# Patient Record
Sex: MALE | Race: BLACK OR AFRICAN AMERICAN | NOT HISPANIC OR LATINO | Employment: STUDENT | URBAN - METROPOLITAN AREA
[De-identification: names, ages, dates, MRNs, and addresses within clinical notes are randomized per-mention and may not be internally consistent; named-entity substitution may affect disease eponyms.]

---

## 2023-03-14 RX ORDER — SODIUM FLUORIDE 5 MG/G
GEL, DENTIFRICE DENTAL
COMMUNITY
Start: 2022-02-04 | End: 2023-05-02 | Stop reason: SDUPTHER

## 2023-03-14 RX ORDER — BENZOYL PEROXIDE 100 MG/ML
LIQUID TOPICAL
COMMUNITY
Start: 2022-02-04 | End: 2023-05-02 | Stop reason: SDUPTHER

## 2023-03-15 RX ORDER — BENZOYL PEROXIDE 100 MG/ML
LIQUID TOPICAL
Qty: 237 G | Refills: 2 | OUTPATIENT
Start: 2023-03-15

## 2023-03-15 RX ORDER — SODIUM FLUORIDE 5 MG/G
56 GEL, DENTIFRICE DENTAL DAILY
Qty: 56 G | Refills: 2 | OUTPATIENT
Start: 2023-03-15

## 2023-04-06 DIAGNOSIS — K59.04 CHRONIC IDIOPATHIC CONSTIPATION: Primary | ICD-10-CM

## 2023-04-06 RX ORDER — BISACODYL 5 MG
5 TABLET, DELAYED RELEASE (ENTERIC COATED) ORAL DAILY PRN
COMMUNITY
Start: 2022-04-22 | End: 2023-04-06 | Stop reason: SDUPTHER

## 2023-04-07 RX ORDER — BISACODYL 5 MG
5 TABLET, DELAYED RELEASE (ENTERIC COATED) ORAL DAILY PRN
Qty: 90 TABLET | Refills: 0 | Status: SHIPPED | OUTPATIENT
Start: 2023-04-07 | End: 2023-05-02 | Stop reason: SDUPTHER

## 2023-05-02 ENCOUNTER — OFFICE VISIT (OUTPATIENT)
Dept: PRIMARY CARE | Facility: CLINIC | Age: 23
End: 2023-05-02
Payer: MEDICAID

## 2023-05-02 VITALS
BODY MASS INDEX: 16.29 KG/M2 | HEART RATE: 66 BPM | SYSTOLIC BLOOD PRESSURE: 120 MMHG | RESPIRATION RATE: 17 BRPM | DIASTOLIC BLOOD PRESSURE: 77 MMHG | OXYGEN SATURATION: 99 % | WEIGHT: 111 LBS

## 2023-05-02 DIAGNOSIS — K08.9 DENTAL DISEASE: ICD-10-CM

## 2023-05-02 DIAGNOSIS — Z00.00 HEALTH MAINTENANCE EXAMINATION: Primary | ICD-10-CM

## 2023-05-02 DIAGNOSIS — L70.8 OTHER ACNE: ICD-10-CM

## 2023-05-02 PROCEDURE — 99395 PREV VISIT EST AGE 18-39: CPT | Performed by: STUDENT IN AN ORGANIZED HEALTH CARE EDUCATION/TRAINING PROGRAM

## 2023-05-02 PROCEDURE — 1036F TOBACCO NON-USER: CPT | Performed by: STUDENT IN AN ORGANIZED HEALTH CARE EDUCATION/TRAINING PROGRAM

## 2023-05-02 RX ORDER — HYDROXYZINE HYDROCHLORIDE 10 MG/1
TABLET, FILM COATED ORAL 2 TIMES DAILY
COMMUNITY
Start: 2020-06-26 | End: 2023-08-01 | Stop reason: ALTCHOICE

## 2023-05-02 RX ORDER — SODIUM FLUORIDE 5 MG/G
PASTE, DENTIFRICE DENTAL
COMMUNITY

## 2023-05-02 RX ORDER — ADAPALENE 1 MG/G
1 GEL TOPICAL NIGHTLY
Qty: 45 G | Refills: 3 | Status: SHIPPED | OUTPATIENT
Start: 2023-05-02 | End: 2023-08-01 | Stop reason: ALTCHOICE

## 2023-05-02 RX ORDER — BISACODYL 5 MG
1 TABLET, DELAYED RELEASE (ENTERIC COATED) ORAL DAILY
COMMUNITY
Start: 2022-04-22 | End: 2023-05-02 | Stop reason: SDUPTHER

## 2023-05-02 RX ORDER — LAMOTRIGINE 150 MG/1
1 TABLET ORAL 2 TIMES DAILY
COMMUNITY
Start: 2019-07-16 | End: 2024-03-28

## 2023-05-02 RX ORDER — DIPHENHYDRAMINE HCL 25 MG/1
TABLET ORAL
COMMUNITY
Start: 2023-02-20 | End: 2023-08-01 | Stop reason: ALTCHOICE

## 2023-05-02 RX ORDER — POLYETHYLENE GLYCOL 3350 17 G/17G
POWDER, FOR SOLUTION ORAL
COMMUNITY
Start: 2013-04-09 | End: 2023-12-18 | Stop reason: SDUPTHER

## 2023-05-02 RX ORDER — CLINDAMYCIN PHOSPHATE 10 UG/ML
LOTION TOPICAL 2 TIMES DAILY
COMMUNITY
Start: 2022-02-04 | End: 2023-05-15

## 2023-05-02 RX ORDER — BISMUTH SUBSALICYLATE 525 MG/30ML
LIQUID ORAL
COMMUNITY
Start: 2023-02-20

## 2023-05-02 RX ORDER — HYDROCORTISONE 1 %
CREAM (GRAM) TOPICAL 2 TIMES DAILY
COMMUNITY
Start: 2017-11-29

## 2023-05-02 RX ORDER — SODIUM FLUORIDE 5 MG/G
1 GEL, DENTIFRICE DENTAL 2 TIMES DAILY
Qty: 56 G | Refills: 11 | Status: SHIPPED | OUTPATIENT
Start: 2023-05-02

## 2023-05-02 RX ORDER — CLONIDINE HYDROCHLORIDE 0.1 MG/1
TABLET ORAL
COMMUNITY
Start: 2017-05-19 | End: 2023-12-22 | Stop reason: SDUPTHER

## 2023-05-02 RX ORDER — CYPROHEPTADINE HYDROCHLORIDE 4 MG/1
TABLET ORAL
COMMUNITY
Start: 2020-01-16 | End: 2023-07-07

## 2023-05-02 RX ORDER — TALC
POWDER (GRAM) TOPICAL
COMMUNITY
End: 2023-08-01 | Stop reason: ALTCHOICE

## 2023-05-02 RX ORDER — GUAR GUM
POWDER (GRAM) ORAL
COMMUNITY

## 2023-05-02 RX ORDER — BENZOYL PEROXIDE 100 MG/ML
LIQUID TOPICAL
COMMUNITY
Start: 2022-02-04 | End: 2023-05-02 | Stop reason: SDUPTHER

## 2023-05-02 RX ORDER — BENZOYL PEROXIDE 100 MG/ML
LIQUID TOPICAL
Qty: 187 G | Refills: 11 | Status: SHIPPED | OUTPATIENT
Start: 2023-05-02 | End: 2023-05-02 | Stop reason: SDUPTHER

## 2023-05-02 RX ORDER — BENZOYL PEROXIDE 100 MG/ML
LIQUID TOPICAL
Qty: 187 G | Refills: 11 | Status: SHIPPED | OUTPATIENT
Start: 2023-05-02

## 2023-05-02 RX ORDER — TRIAMCINOLONE ACETONIDE 0.25 MG/G
CREAM TOPICAL
COMMUNITY
End: 2023-08-01 | Stop reason: ALTCHOICE

## 2023-05-02 RX ORDER — MIRTAZAPINE 45 MG/1
1 TABLET, FILM COATED ORAL NIGHTLY
COMMUNITY
Start: 2014-03-23 | End: 2023-12-22 | Stop reason: SDUPTHER

## 2023-05-02 RX ORDER — PSEUDOEPH/DM/GUAIFEN/ACETAMIN 30-10-324
EXPECTORANT ORAL
COMMUNITY
Start: 2023-02-20

## 2023-05-02 RX ORDER — ADHESIVE BANDAGE
BANDAGE TOPICAL
COMMUNITY
Start: 2021-06-02

## 2023-05-02 RX ORDER — BACLOFEN 20 MG
1 TABLET ORAL DAILY
COMMUNITY
Start: 2019-05-07 | End: 2023-08-03

## 2023-05-02 RX ORDER — NALTREXONE HYDROCHLORIDE 50 MG/1
TABLET, FILM COATED ORAL 2 TIMES DAILY
COMMUNITY
Start: 2020-02-26 | End: 2023-12-22 | Stop reason: SDUPTHER

## 2023-05-02 RX ORDER — DIMETHICONE, CAMPHOR (SYNTHETIC), MENTHOL, AND PHENOL 1.1; .5; .625; .5 G/100G; G/100G; G/100G; G/100G
OINTMENT TOPICAL
COMMUNITY
Start: 2023-02-21

## 2023-05-02 RX ORDER — PETROLATUM,WHITE 41 %
OINTMENT (GRAM) TOPICAL
COMMUNITY
Start: 2019-04-17

## 2023-05-02 RX ORDER — CHOLECALCIFEROL (VITAMIN D3) 50 MCG
1 TABLET ORAL DAILY
COMMUNITY
Start: 2021-10-21 | End: 2023-12-22 | Stop reason: SDUPTHER

## 2023-05-02 RX ORDER — DEXTROMETHORPHAN HYDROBROMIDE, GUAIFENESIN 10; 100 MG/5ML; MG/5ML
LIQUID ORAL
COMMUNITY
Start: 2023-02-21

## 2023-05-02 RX ORDER — POLYETHYLENE GLYCOL
OINTMENT (GRAM) TOPICAL
COMMUNITY
Start: 2023-02-20 | End: 2023-12-22 | Stop reason: SDUPTHER

## 2023-05-02 RX ORDER — OMEPRAZOLE 40 MG/1
1 CAPSULE, DELAYED RELEASE ORAL DAILY
COMMUNITY
Start: 2020-03-30 | End: 2023-10-13 | Stop reason: SDUPTHER

## 2023-05-02 RX ORDER — LINACLOTIDE 145 UG/1
CAPSULE, GELATIN COATED ORAL
COMMUNITY
Start: 2022-03-25 | End: 2024-02-26 | Stop reason: SDUPTHER

## 2023-05-02 RX ORDER — ADAPALENE 1 MG/G
1 GEL TOPICAL NIGHTLY
COMMUNITY
Start: 2020-05-01 | End: 2023-05-02 | Stop reason: SDUPTHER

## 2023-05-02 RX ORDER — LACTOSE-REDUCED FOOD 0.04G-1/ML
LIQUID (ML) ORAL
COMMUNITY
Start: 2020-01-16

## 2023-05-02 NOTE — PROGRESS NOTES
John Williamson is a 22 y.o. male seen in Clinic at /[facility] by Dr. Momo Dc on 05/02/23 for routine care, as well as for management of the following chronic medical conditions: Epilepsy, chronic constipation/GI motility issues, ASD, OCD. Patient is accompanied to this visit by his father.     HPI:   #Constipation, EOE, Malnutrition   - Follows with Peds GI at [place]   - PPI for EOE (BID per last note); endoscopy due in 2024  - Miralax, Motegrity, Milk of Mag, Linzess, daily suppository   - Nutritional supplements ([email or web address] 4x/day); close weight monitoring and f/u     #Epilepsy  - Follows with Peds Neuro  - Lamictal 150mg BID   - No recent seizures, well controlled   - Annual visits and levels    #Behavioral Issues, ASD, OCD  - Adult psych, Amrit Kellymantel   - Clonidine 0.05mg TID, Naltrexone 12.5mg BID, Hydroxyzine 10mg BID, Remeron 45mg QHS    Past Medical History: as above (ASD diagnosed around 2.5 years of age)  Birth History: uncomplicated pregnancy and birth history per father     Past Medical History:   Diagnosis Date    Contusion of scalp, subsequent encounter 06/15/2017    Contusion of scalp, subsequent encounter    Developmental disorder of scholastic skills, unspecified     Learning disability    Local infection of the skin and subcutaneous tissue, unspecified 02/22/2014    Finger infection    Personal history of diseases of the blood and blood-forming organs and certain disorders involving the immune mechanism 11/18/2020    History of anemia    Personal history of other (healed) physical injury and trauma 06/15/2017    History of strain    Personal history of other diseases of the nervous system and sense organs 03/23/2014    History of acute otitis media    Personal history of other mental and behavioral disorders     History of mental disorder    Personal history of other specified conditions 06/20/2020    History of fever    Unspecified convulsions (CMS/HCC) 12/23/2014    Seizures     Unspecified injury of left ankle, initial encounter 05/24/2018    Left ankle injury     Subspecialty Medical Care: GI and Neuro (peds) but will continue to see him; adult psychiatry     Past Surgical History: upper GI, no sedation issues   No past surgical history on file.    Medications: Prevident, Acne     Current Outpatient Medications:     Banophen 25 mg tablet, Take by mouth., Disp: , Rfl:     bismuth subsalicylate (Pepto Bismol) 262 mg/15 mL suspension, Take by mouth., Disp: , Rfl:     Blistex Medicated 0.6-0.5-1.1-0.5 % ointment ointment, , Disp: , Rfl:     Chest Congestion Relief DM  mg/5 mL syrup, , Disp: , Rfl:     cholecalciferol (Vitamin D-3) 50 MCG (2000 UT) tablet, Take 1 tablet (50 mcg) by mouth once daily., Disp: , Rfl:     cloNIDine (Catapres) 0.1 mg tablet, Take by mouth., Disp: , Rfl:     cyproheptadine (Periactin) 4 mg tablet, Take by mouth., Disp: , Rfl:     food supplemt, lactose-reduced (Boost Plus) 0.06 gram- 1.5 kcal/mL liquid, Take by mouth., Disp: , Rfl:     hydrocortisone 1 % cream, Apply topically twice a day., Disp: , Rfl:     hydrOXYzine HCL (Atarax) 10 mg tablet, Take by mouth twice a day., Disp: , Rfl:     lamoTRIgine (LaMICtal) 150 mg tablet, Take 1 tablet (150 mg) by mouth 2 times a day., Disp: , Rfl:     Linzess 145 mcg capsule, Take by mouth once daily., Disp: , Rfl:     magnesium hydroxide (Milk of Magnesia) 400 mg/5 mL suspension, Take by mouth., Disp: , Rfl:     magnesium oxide 500 mg tablet, Take 1 tablet (500 mg) by mouth once daily., Disp: , Rfl:     menthol 5.8 mg lozenge, Take by mouth., Disp: , Rfl:     mirtazapine (Remeron) 45 mg tablet, Take 1 tablet (45 mg) by mouth once daily at bedtime., Disp: , Rfl:     naltrexone (Depade) 50 mg tablet, Take by mouth twice a day., Disp: , Rfl:     omeprazole (PriLOSEC) 40 mg DR capsule, Take 1 capsule (40 mg) by mouth once daily., Disp: , Rfl:     pe glycol 3350-peg 400, bulk, (Lip Balm Base, Bulk,) 1.3 gm/3 mL ointment, USE  AS DIRECTED., Disp: , Rfl:     polyethylene glycol (Glycolax) 17 gram/dose powder, Take by mouth., Disp: , Rfl:     sodium phosphates 19-7 gram/197 mL enema, Insert into the rectum., Disp: , Rfl:     Stool Softener 100 mg capsule, Take 1 capsule (100 mg) by mouth 2 times a day., Disp: , Rfl:     Triple Antibiotic ointment, APPLY TOPICALLY TO CUTS/SCRAPES/SKIN ABRASIONS EVERY 12 HOURS AS NEEDED TO PREVENT SKIN INFECTIONS, Disp: , Rfl:     white petrolatum (Aquaphor Healing) 41 % ointment ointment, apply to lips every 4 hours while awake and PRN, Disp: , Rfl:     witch hazeL 20 % pads, medicated, USE AS DIRECTED ON PACKAGE, Disp: , Rfl:     adapalene (Differin) 0.1 % gel, Apply 1 Application topically once daily at bedtime., Disp: 45 g, Rfl: 3    benzoyl peroxide (Benzac AC) 10 % external wash, Apply topically once daily., Disp: 187 g, Rfl: 11    bisacodyl (Dulcolax) 5 mg EC tablet, Take 1 tablet (5 mg) by mouth once daily. Do not crush, chew, or split., Disp: , Rfl:     bisacodyl (Dulcolax, bisacodyl,) 10 mg suppository, Insert 1 suppository (10 mg) into the rectum once daily., Disp: , Rfl:     clindamycin (Cleocin T) 1 % lotion, APPLY TOPICALLY TO FACE, CHEST, AND BACK ONCE DAILY AFTER WASHING., Disp: 60 mL, Rfl: 3    fluoride, sodium, (Prevident 5000 Plus) 1.1 % dental cream, Apply topically., Disp: , Rfl:     fluoride, sodium, (PreviDent) 1.1 % gel, Apply 1 Application to teeth 2 times a day., Disp: 56 g, Rfl: 11    melatonin 3 mg tablet, Take by mouth., Disp: , Rfl:     nutritional supplement-caloric (Benecalorie) 7.5 kcal/mL liquid, Take by mouth., Disp: , Rfl:     omeprazole (PriLOSEC) 40 mg DR capsule, TAKE 1 CAPSULE BY MOUTH ONCE A DAY., Disp: 90 capsule, Rfl: 1    triamcinolone (Kenalog) 0.025 % cream, Apply topically to affected area 2 times a day until resolved., Disp: , Rfl:     Vitamin D3 50 mcg (2,000 unit) tablet, TAKE 1 TABLET BY MOUTH DAILY., Disp: 90 tablet, Rfl: 1    witch hazel-glycerin-aloe vera  "50 % pads, medicated, Apply topically., Disp: , Rfl:   Pharmacy: [facility]    Allergies: NKDA  No Known Allergies    Immunizations: considering HPV; Tdap due 2026; COVID x3     Family History: Dad with HTN, Mom with Depression, Paternal Grandfather with dementia (late onset, likely Alzheimer's)  No family history on file.    Social History:   Home/Living Situation: [facility], feels safe; visits every Sunday; mom and dad, older sister  Education: Graduated from [employer]; day program in [place], enjoys himself   Activities: enjoys sports, cars  Drug Use: never use  Diet: trouble with weight gain; takes everything by mouth; very long to eat meals; never nutrition through feeding tube; previously on Boost, now on Mtime   Sexuality/Contraception/Menstrual History: never sexually active; family defers STI testing   Suicide/Depression/Anxiety: describes his mood as \"happy\"   Sleep: no sleep concerns    Transition Processes:  Financial/Health Insurance: Medicaid   Transportation: Mom and Dad   Legal/Guardian: Mother has guardianship   Contact Information: [phone] (dad); [phone] (mom)     Visit Vitals  /77 (BP Location: Right arm, Patient Position: Sitting)   Pulse 66   Resp 17   Wt 50.3 kg (111 lb)   SpO2 99%   BMI 16.29 kg/m²   Smoking Status Never   BSA 1.57 m²      PHYSICAL EXAM:   General: well appearing AA male, largely non-verbal with one words responses, limited eye contact during visit, unable to really meaningfully engage in visit   HEENT: MMM, NCAT  CV: RRR  PULM: CTAB  ABD: thin, soft, NT, ND, + bowel sounds  : no suprapubic or CVA tenderness  EXT: WWP, thin, no edema  SKIN: no rashes  NEURO: poor eye contact and difficult to engage in exam, normal gait, preserved strength, symmetric facies   PSYCH: largely non-verbal but not aggressive and overall pleasant during encounter     Assessment/Plan    John Williamson is a 22 y.o. male seen in Clinic at /[facility] by Dr. Barr " Dao on 05/02/23 for routine care, as well as for management of the following chronic medical conditions: Epilepsy, chronic constipation/GI motility issues, ASD, OCD. Patient is accompanied to this visit by his father.     HPI:   #Constipation, EOE, Malnutrition   - Follows with Peds GI at [place]   - PPI for EOE (BID per last note); endoscopy due in 2024  - Miralax, Motegrity, Milk of Mag, Linzess, daily suppository   - Nutritional supplements ([employer] 4x/day); close weight monitoring and f/u     #Epilepsy  - Follows with Peds Neuro  - Lamictal 150mg BID   - No recent seizures, well controlled   - Annual visits and levels    #Behavioral Issues, ASD, OCD  - Adult psych, Amrit Nguyen   - Clonidine 0.05mg TID, Naltrexone 12.5mg BID, Hydroxyzine 10mg BID, Remeron 45mg QHS    #Health Maintenance   - Vision, Hearing screens: no recent concerns   - Counseling regarding alcohol/tobacco/drug use: DENIES USE   - Depression screen: follows with psych   - BMI, Lipid, A1C screening and nutritional/exercise counseling: repeat CMP today, prior lipid panel 2022 reassuring; repeat every 2-3 years   - Blood Pressure: 120/77  - Safe Sexual Practices, STI, HIV screening: never sexually active, family defers testing     #Transition of Care/Young Adult Care  - Health Literacy Assessment: working to engage patient in visit; accompanied by father; mother has guardianship of patient   - Medications: Active medications reviewed and updated  - Allergies: NONE  - Immunizations: considering HPV, Tdap due 2026; COVID and Flu shots recommended per CDC guidelines   - Identified Adult or Subspecialty Providers: Dr. Momo Dc, Dr. Amrit Nguyen; follows with Peds GI and Neuro teams who per father are happy to continue seeing him   - Resources Provided: [facility]; discussed inpatient Med-Peds consult service should he require this during future admissions     Return to clinic in annually for CPE, sooner if  any issues arise.     Momo Dc MD  Internal Medicine-Pediatrics   [facility] [address], [address]  P: [phone], F: [phone]

## 2023-05-02 NOTE — PATIENT INSTRUCTIONS
Thank you for coming in to see us today!    Refills for you facewash and face cream for acne has been sent to the pharmacy, along with your specialty toothpaste.     Please get labs done with the next Lamotrigine level needed from neurology.    Let me know if you need anything else in the meantime!    Best,  Dr. Dc

## 2023-05-15 DIAGNOSIS — L70.8 OTHER ACNE: Primary | ICD-10-CM

## 2023-05-15 RX ORDER — CLINDAMYCIN PHOSPHATE 10 UG/ML
LOTION TOPICAL
Qty: 60 ML | Refills: 0 | Status: SHIPPED | OUTPATIENT
Start: 2023-05-15 | End: 2023-05-16

## 2023-05-16 DIAGNOSIS — L70.8 OTHER ACNE: ICD-10-CM

## 2023-05-16 RX ORDER — CLINDAMYCIN PHOSPHATE 10 UG/ML
LOTION TOPICAL
Qty: 60 ML | Refills: 3 | Status: SHIPPED | OUTPATIENT
Start: 2023-05-16 | End: 2024-02-19

## 2023-06-20 RX ORDER — DOCUSATE SODIUM 100 MG
100 CAPSULE ORAL 2 TIMES DAILY
COMMUNITY
Start: 2023-05-05 | End: 2024-02-27

## 2023-06-20 RX ORDER — BISACODYL 5 MG
5 TABLET, DELAYED RELEASE (ENTERIC COATED) ORAL DAILY
COMMUNITY
End: 2023-12-18 | Stop reason: SDUPTHER

## 2023-06-20 RX ORDER — BISACODYL 10 MG/1
10 SUPPOSITORY RECTAL DAILY
COMMUNITY
End: 2023-11-14 | Stop reason: SDUPTHER

## 2023-07-07 DIAGNOSIS — R62.7 FAILURE TO THRIVE IN ADULT: Primary | ICD-10-CM

## 2023-07-07 RX ORDER — CYPROHEPTADINE HYDROCHLORIDE 4 MG/1
8 TABLET ORAL NIGHTLY
Qty: 180 TABLET | Refills: 1 | Status: SHIPPED | OUTPATIENT
Start: 2023-07-07 | End: 2023-12-11 | Stop reason: SDUPTHER

## 2023-08-01 ENCOUNTER — OFFICE VISIT (OUTPATIENT)
Dept: PRIMARY CARE | Facility: CLINIC | Age: 23
End: 2023-08-01
Payer: MEDICAID

## 2023-08-01 VITALS
BODY MASS INDEX: 17.18 KG/M2 | OXYGEN SATURATION: 98 % | WEIGHT: 116 LBS | HEIGHT: 69 IN | HEART RATE: 91 BPM | DIASTOLIC BLOOD PRESSURE: 76 MMHG | SYSTOLIC BLOOD PRESSURE: 118 MMHG

## 2023-08-01 DIAGNOSIS — F84.0 AUTISTIC DISORDER (HHS-HCC): ICD-10-CM

## 2023-08-01 DIAGNOSIS — L70.8 OTHER ACNE: ICD-10-CM

## 2023-08-01 DIAGNOSIS — J30.2 SEASONAL ALLERGIES: ICD-10-CM

## 2023-08-01 DIAGNOSIS — F41.9 ANXIETY: ICD-10-CM

## 2023-08-01 DIAGNOSIS — B35.1 ONYCHOMYCOSIS: Primary | ICD-10-CM

## 2023-08-01 DIAGNOSIS — R21 RASH: ICD-10-CM

## 2023-08-01 DIAGNOSIS — R05.9 COUGH, UNSPECIFIED TYPE: ICD-10-CM

## 2023-08-01 DIAGNOSIS — G40.909 NONINTRACTABLE EPILEPSY WITHOUT STATUS EPILEPTICUS, UNSPECIFIED EPILEPSY TYPE (MULTI): ICD-10-CM

## 2023-08-01 DIAGNOSIS — G47.00 INSOMNIA, UNSPECIFIED TYPE: ICD-10-CM

## 2023-08-01 PROBLEM — G47.9 SLEEP DISTURBANCES: Status: ACTIVE | Noted: 2023-08-01

## 2023-08-01 PROBLEM — K13.0 DRY LIPS: Status: ACTIVE | Noted: 2023-08-01

## 2023-08-01 PROBLEM — R62.7 FAILURE TO THRIVE IN ADULT: Status: ACTIVE | Noted: 2023-08-01

## 2023-08-01 PROBLEM — R63.4 WEIGHT LOSS, UNINTENTIONAL: Status: ACTIVE | Noted: 2023-08-01

## 2023-08-01 PROBLEM — K59.09 CHRONIC CONSTIPATION: Status: ACTIVE | Noted: 2023-08-01

## 2023-08-01 PROBLEM — R14.0 ABDOMINAL BLOATING: Status: ACTIVE | Noted: 2023-08-01

## 2023-08-01 PROBLEM — K56.41 FECAL IMPACTION OF COLON (MULTI): Status: ACTIVE | Noted: 2023-08-01

## 2023-08-01 PROBLEM — F91.8 TEMPER TANTRUMS: Status: ACTIVE | Noted: 2023-08-01

## 2023-08-01 PROBLEM — L70.9 ACNE: Status: ACTIVE | Noted: 2023-08-01

## 2023-08-01 PROBLEM — K64.9 HEMORRHOIDS: Status: ACTIVE | Noted: 2023-08-01

## 2023-08-01 PROBLEM — R46.81 OBSESSIVE-COMPULSIVE BEHAVIOR: Status: ACTIVE | Noted: 2023-08-01

## 2023-08-01 PROBLEM — T14.8XXA SKIN ABRASION: Status: ACTIVE | Noted: 2023-08-01

## 2023-08-01 PROBLEM — R19.00 ABDOMINAL MASS: Status: ACTIVE | Noted: 2023-08-01

## 2023-08-01 PROBLEM — F42.9 OBSESSIVE COMPULSIVE DISORDER: Status: ACTIVE | Noted: 2023-08-01

## 2023-08-01 PROBLEM — J30.9 ALLERGIC RHINITIS: Status: ACTIVE | Noted: 2023-08-01

## 2023-08-01 PROBLEM — K58.9 IBS (IRRITABLE BOWEL SYNDROME): Status: ACTIVE | Noted: 2023-08-01

## 2023-08-01 PROBLEM — K20.0 EOSINOPHILIC ESOPHAGITIS: Status: ACTIVE | Noted: 2023-08-01

## 2023-08-01 PROBLEM — F71 MODERATE INTELLECTUAL DISABILITIES: Status: ACTIVE | Noted: 2023-08-01

## 2023-08-01 PROCEDURE — 99213 OFFICE O/P EST LOW 20 MIN: CPT | Performed by: STUDENT IN AN ORGANIZED HEALTH CARE EDUCATION/TRAINING PROGRAM

## 2023-08-01 PROCEDURE — 1036F TOBACCO NON-USER: CPT | Performed by: STUDENT IN AN ORGANIZED HEALTH CARE EDUCATION/TRAINING PROGRAM

## 2023-08-01 RX ORDER — CICLOPIROX 80 MG/ML
SOLUTION TOPICAL NIGHTLY
Qty: 6.6 ML | Refills: 1 | Status: SHIPPED | OUTPATIENT
Start: 2023-08-01

## 2023-08-01 RX ORDER — PRUCALOPRIDE 2 MG/1
1 TABLET, FILM COATED ORAL DAILY
COMMUNITY
Start: 2023-05-17

## 2023-08-01 NOTE — PROGRESS NOTES
John Williamson is a 22 y.o. male seen in Clinic at /[facility] by Dr. Momo Dc on 08/01/23 for routine care, as well as for management of the following chronic medical conditions: Epilepsy, chronic constipation/GI motility issues, ASD, OCD. Patient is accompanied to this visit by his father. He presents today with concern for great toe discoloration bilaterally. On exam, discoloration is very mild, almost non-existent of R great toe. Some discoloration and possibly very early onychomycosis of L great toe, most notably on the border. No indication for oral treatment at this time, can trial topical and continue with good hygiene and nail care. Podiatry referral per request for ongoing care.     #Onychomycosis  - mild, L great toe  - topical antifungal, Ciclopirox  - podiatry for ongoing care     CHRONIC MEDICAL CONDITIONS:   #Constipation, EOE, Malnutrition   - Follows with Peds GI at [place]   - PPI for EOE (BID per last note); endoscopy due in 2024  - Miralax, Motegrity, Milk of Mag, Linzess, daily suppository   - Nutritional supplements (Mysterio 4x/day); close weight monitoring and f/u--weight up 5 punds from visit in May 2023!    #Epilepsy  - Follows with Peds Neuro  - Lamictal 150mg BID   - No recent seizures, well controlled   - Annual visits and levels    #Behavioral Issues, ASD, OCD  - Adult psych, Amrit Kellymankellyl   - Clonidine 0.05mg TID, Naltrexone 12.5mg BID, Hydroxyzine 10mg BID, Remeron 45mg QHS    Past Medical History: as above (ASD diagnosed around 2.5 years of age)  Birth History: uncomplicated pregnancy and birth history per father     Past Medical History:   Diagnosis Date    Contusion of scalp, subsequent encounter 06/15/2017    Contusion of scalp, subsequent encounter    Developmental disorder of scholastic skills, unspecified     Learning disability    Local infection of the skin and subcutaneous tissue, unspecified 02/22/2014    Finger infection    Personal history of diseases of  the blood and blood-forming organs and certain disorders involving the immune mechanism 11/18/2020    History of anemia    Personal history of other (healed) physical injury and trauma 06/15/2017    History of strain    Personal history of other diseases of the nervous system and sense organs 03/23/2014    History of acute otitis media    Personal history of other mental and behavioral disorders     History of mental disorder    Personal history of other specified conditions 06/20/2020    History of fever    Unspecified convulsions (CMS/HCC) 12/23/2014    Seizures    Unspecified injury of left ankle, initial encounter 05/24/2018    Left ankle injury     Subspecialty Medical Care: GI and Neuro (peds) but will continue to see him; adult psychiatry     Past Surgical History: upper GI, no sedation issues   No past surgical history on file.    Medications: Prevident, Acne   Current Outpatient Medications:     benzoyl peroxide (Benzac AC) 10 % external wash, Apply topically once daily., Disp: 187 g, Rfl: 11    bisacodyl (Dulcolax) 5 mg EC tablet, Take 1 tablet (5 mg) by mouth once daily. Do not crush, chew, or split., Disp: , Rfl:     bisacodyl (Dulcolax, bisacodyl,) 10 mg suppository, Insert 1 suppository (10 mg) into the rectum once daily., Disp: , Rfl:     bismuth subsalicylate (Pepto Bismol) 262 mg/15 mL suspension, Take by mouth., Disp: , Rfl:     Blistex Medicated 0.6-0.5-1.1-0.5 % ointment ointment, , Disp: , Rfl:     Chest Congestion Relief DM  mg/5 mL syrup, , Disp: , Rfl:     cholecalciferol (Vitamin D-3) 50 MCG (2000 UT) tablet, Take 1 tablet (50 mcg) by mouth once daily., Disp: , Rfl:     clindamycin (Cleocin T) 1 % lotion, APPLY TOPICALLY TO FACE, CHEST, AND BACK ONCE DAILY AFTER WASHING., Disp: 60 mL, Rfl: 3    cloNIDine (Catapres) 0.1 mg tablet, Take by mouth., Disp: , Rfl:     cyproheptadine (Periactin) 4 mg tablet, Take 2 tablets (8 mg) by mouth once daily at bedtime., Disp: 180 tablet, Rfl: 1     fluoride, sodium, (Prevident 5000 Plus) 1.1 % dental cream, Apply topically., Disp: , Rfl:     fluoride, sodium, (PreviDent) 1.1 % gel, Apply 1 Application to teeth 2 times a day., Disp: 56 g, Rfl: 11    food supplemt, lactose-reduced (Boost Plus) 0.06 gram- 1.5 kcal/mL liquid, Take by mouth., Disp: , Rfl:     hydrocortisone 1 % cream, Apply topically twice a day., Disp: , Rfl:     lamoTRIgine (LaMICtal) 150 mg tablet, Take 1 tablet (150 mg) by mouth 2 times a day., Disp: , Rfl:     Linzess 145 mcg capsule, Take by mouth once daily., Disp: , Rfl:     magnesium hydroxide (Milk of Magnesia) 400 mg/5 mL suspension, Take by mouth., Disp: , Rfl:     magnesium oxide 500 mg tablet, Take 1 tablet (500 mg) by mouth once daily., Disp: , Rfl:     mirtazapine (Remeron) 45 mg tablet, Take 1 tablet (45 mg) by mouth once daily at bedtime., Disp: , Rfl:     Motegrity 2 mg tablet, Take 1 tablet by mouth once daily., Disp: , Rfl:     naltrexone (Depade) 50 mg tablet, Take by mouth twice a day., Disp: , Rfl:     nutritional supplement-caloric (Benecalorie) 7.5 kcal/mL liquid, Take by mouth., Disp: , Rfl:     omeprazole (PriLOSEC) 40 mg DR capsule, Take 1 capsule (40 mg) by mouth once daily., Disp: , Rfl:     pe glycol 3350-peg 400, bulk, (Lip Balm Base, Bulk,) 1.3 gm/3 mL ointment, USE AS DIRECTED., Disp: , Rfl:     polyethylene glycol (Glycolax) 17 gram/dose powder, Take by mouth., Disp: , Rfl:     sodium phosphates 19-7 gram/197 mL enema, Insert into the rectum., Disp: , Rfl:     Stool Softener 100 mg capsule, Take 1 capsule (100 mg) by mouth 2 times a day., Disp: , Rfl:     Triple Antibiotic ointment, APPLY TOPICALLY TO CUTS/SCRAPES/SKIN ABRASIONS EVERY 12 HOURS AS NEEDED TO PREVENT SKIN INFECTIONS, Disp: , Rfl:     Vitamin D3 50 mcg (2,000 unit) tablet, TAKE 1 TABLET BY MOUTH DAILY., Disp: 90 tablet, Rfl: 1    white petrolatum (Aquaphor Healing) 41 % ointment ointment, apply to lips every 4 hours while awake and PRN, Disp: , Rfl:      witch hazeL 20 % pads, medicated, USE AS DIRECTED ON PACKAGE, Disp: , Rfl:     witch hazel-glycerin-aloe vera 50 % pads, medicated, Apply topically., Disp: , Rfl:     adapalene (Differin) 0.1 % gel, Apply 1 Application topically once daily at bedtime., Disp: 45 g, Rfl: 3    Banophen 25 mg tablet, Take 1 tablet (25 mg) by mouth as needed at bedtime for itching or allergies., Disp: 90 tablet, Rfl: 1    ciclopirox (Penlac) 8 % solution, Apply topically once daily at bedtime. Apply to affected toenail(s) and adjacent skin once daily in combination with weekly nail trimming and periodic nail debridement. Remove with alcohol every 7 days; continue therapy until nail clearance., Disp: 6.6 mL, Rfl: 1    hydrOXYzine HCL (Atarax) 10 mg tablet, Take 1 tablet (10 mg) by mouth twice a day., Disp: , Rfl:     hydrOXYzine HCL (Atarax) 10 mg tablet, Take 1 tablet (10 mg) by mouth 2 times a day., Disp: 180 tablet, Rfl: 1    melatonin 3 mg tablet, Take 1 tablet (3 mg) by mouth once daily at bedtime., Disp: 90 tablet, Rfl: 3    menthol 5.8 mg lozenge, Take 1 Units by mouth every 4 hours if needed (cough)., Disp: 150 lozenge, Rfl: 3    triamcinolone (Kenalog) 0.025 % cream, Apply topically 2 times a day., Disp: 15 g, Rfl: 3  Pharmacy: [facility]    Allergies: NKDA  No Known Allergies    Immunizations: considering HPV; Tdap due 2026; COVID x3     Family History: Dad with HTN, Mom with Depression, Paternal Grandfather with dementia (late onset, likely Alzheimer's)  No family history on file.    Social History:   Home/Living Situation: [facility], feels safe; visits every Sunday; mom and dad, older sister  Education: Graduated from [employer]; day program in [employer], enjoys himself   Activities: enjoys sports, cars  Drug Use: never use  Diet: trouble with weight gain; takes everything by mouth; very long to eat meals; never nutrition through feeding tube; previously on Boost, now on [employer]  "  Sexuality/Contraception/Menstrual History: never sexually active; family defers STI testing   Suicide/Depression/Anxiety: describes his mood as \"happy\"   Sleep: no sleep concerns    Transition Processes:  Financial/Health Insurance: Medicaid   Transportation: Mom and Dad   Legal/Guardian: Mother has guardianship   Contact Information: [phone] (dad); [phone] (mom)     Visit Vitals  /76   Pulse 91   Ht 1.753 m (5' 9\")   Wt 52.6 kg (116 lb)   SpO2 98%   BMI 17.13 kg/m²   Smoking Status Never   BSA 1.6 m²      PHYSICAL EXAM:   General: well appearing AA male, largely non-verbal with one words responses, limited eye contact during visit, unable to really meaningfully engage in visit   HEENT: MMM, NCAT  CV: RRR  PULM: CTAB  ABD: thin, soft, NT, ND, + bowel sounds  : no suprapubic or CVA tenderness  EXT: WWP, thin, no edema; L great toe nail with mild discoloration around border, R great toe nail with no significant findings  SKIN: no rashes  NEURO: poor eye contact and difficult to engage in exam, normal gait, preserved strength, symmetric facies   PSYCH: largely non-verbal but not aggressive and overall pleasant during encounter     Assessment/Plan    John Williamson is a 22 y.o. male seen in Clinic at /[facility] by Dr. Momo Dc on 08/01/23 for routine care, as well as for management of the following chronic medical conditions: Epilepsy, chronic constipation/GI motility issues, ASD, OCD. Patient is accompanied to this visit by his father. He presents today with concern for great toe discoloration bilaterally. On exam, discoloration is very mild, almost non-existent of R great toe. Some discoloration and possibly very early onychomycosis of L great toe, most notably on the border. No indication for oral treatment at this time, can trial topical and continue with good hygiene and nail care. Podiatry referral per request for ongoing care.     #Onychomycosis  - mild, L great toe  - topical " antifungal, Ciclopirox  - podiatry for ongoing care     CHRONIC MEDICAL CONDITIONS:   #Constipation, EOE, Malnutrition   - Follows with Peds GI at [place]   - PPI for EOE (BID per last note); endoscopy due in 2024  - Miralax, Motegrity, Milk of Mag, Linzess, daily suppository   - Nutritional supplements (Mariah Farms 4x/day); close weight monitoring and f/u--weight up 5 punds from visit in May 2023!    #Epilepsy  - Follows with Peds Neuro  - Lamictal 150mg BID   - No recent seizures, well controlled   - Annual visits and levels    #Behavioral Issues, ASD, OCD  - Adult psych, Amrit Nguyen   - Clonidine 0.05mg TID, Naltrexone 12.5mg BID, Hydroxyzine 10mg BID, Remeron 45mg QHS    #Health Maintenance   - Vision, Hearing screens: no recent concerns   - Counseling regarding alcohol/tobacco/drug use: DENIES USE   - Depression screen: follows with psych   - BMI, Lipid, A1C screening and nutritional/exercise counseling: repeat CMP still pending, lipid panel 2022 reassuring; repeat every 2-3 years   - Blood Pressure: WNL  - Safe Sexual Practices, STI, HIV screening: never sexually active, family defers testing     #Transition of Care/Young Adult Care  - Health Literacy Assessment: working to engage patient in visit; accompanied by father; mother has guardianship of patient   - Medications: Active medications reviewed and updated  - Allergies: NONE  - Immunizations: considering HPV, Tdap due 2026; COVID and Flu shots recommended per CDC guidelines   - Identified Adult or Subspecialty Providers: Dr. Momo Dc, Dr. Amrit Nguyen; follows with Peds GI and Neuro teams who per father are happy to continue seeing him   - Resources Provided: discussed inpatient Med-Peds consult service should he require this during future admissions     Referrals: Podiatry     Return to clinic annually for CPE, sooner if any issues arise.    Momo Dc MD  Internal Medicine-Pediatrics   [facility] [address]  P: [phone], F: [phone]

## 2023-08-01 NOTE — PATIENT INSTRUCTIONS
[facility] in [place]: [email or web address]    Podiatry referral through     Toenails overall look good--I would not treat with oral antifungal medication at this time. I think good foot hygiene and trimming is also reasonable before starting with a topical agent. The left toe, especially at the corners, is the area of most interest to me. Even that, however, is not advanced.     The photos I showed you during visit today are what to be on the lookout for going forward.     Best,   Dr. Dc

## 2023-08-02 ENCOUNTER — TELEPHONE (OUTPATIENT)
Dept: PRIMARY CARE | Facility: CLINIC | Age: 23
End: 2023-08-02

## 2023-08-02 RX ORDER — TALC
3 POWDER (GRAM) TOPICAL NIGHTLY
Qty: 90 TABLET | Refills: 3 | Status: SHIPPED | OUTPATIENT
Start: 2023-08-02

## 2023-08-02 RX ORDER — ADAPALENE 1 MG/G
1 GEL TOPICAL NIGHTLY
Qty: 45 G | Refills: 3 | Status: SHIPPED | OUTPATIENT
Start: 2023-08-02 | End: 2023-11-24

## 2023-08-02 RX ORDER — TRIAMCINOLONE ACETONIDE 0.25 MG/G
CREAM TOPICAL 2 TIMES DAILY
Qty: 15 G | Refills: 3 | Status: SHIPPED | OUTPATIENT
Start: 2023-08-02 | End: 2023-09-20

## 2023-08-02 RX ORDER — HYDROXYZINE HYDROCHLORIDE 10 MG/1
10 TABLET, FILM COATED ORAL 2 TIMES DAILY
Qty: 180 TABLET | Refills: 1 | Status: SHIPPED | OUTPATIENT
Start: 2023-08-02 | End: 2023-12-22 | Stop reason: SDUPTHER

## 2023-08-02 RX ORDER — DIPHENHYDRAMINE HCL 25 MG/1
25 TABLET ORAL NIGHTLY PRN
Qty: 90 TABLET | Refills: 1 | Status: SHIPPED | OUTPATIENT
Start: 2023-08-02

## 2023-08-02 RX ORDER — HYDROXYZINE HYDROCHLORIDE 10 MG/1
10 TABLET, FILM COATED ORAL 2 TIMES DAILY
COMMUNITY
End: 2023-12-22 | Stop reason: SDUPTHER

## 2023-08-03 DIAGNOSIS — F84.0 AUTISTIC DISORDER (HHS-HCC): Primary | ICD-10-CM

## 2023-08-03 RX ORDER — BACLOFEN 20 MG
1 TABLET ORAL DAILY
Qty: 90 TABLET | Refills: 1 | Status: SHIPPED | OUTPATIENT
Start: 2023-08-03 | End: 2023-10-10

## 2023-09-20 DIAGNOSIS — R21 RASH: ICD-10-CM

## 2023-09-20 RX ORDER — TRIAMCINOLONE ACETONIDE 0.25 MG/G
CREAM TOPICAL
Qty: 15 G | Refills: 0 | Status: SHIPPED | OUTPATIENT
Start: 2023-09-20

## 2023-10-03 ENCOUNTER — OFFICE VISIT (OUTPATIENT)
Dept: PEDIATRIC GASTROENTEROLOGY | Facility: CLINIC | Age: 23
End: 2023-10-03
Payer: MEDICAID

## 2023-10-03 VITALS
HEIGHT: 68 IN | HEART RATE: 90 BPM | DIASTOLIC BLOOD PRESSURE: 82 MMHG | SYSTOLIC BLOOD PRESSURE: 130 MMHG | BODY MASS INDEX: 17.96 KG/M2 | WEIGHT: 118.5 LBS

## 2023-10-03 DIAGNOSIS — K56.41 FECAL IMPACTION IN RECTUM (MULTI): Primary | ICD-10-CM

## 2023-10-03 PROCEDURE — 1036F TOBACCO NON-USER: CPT | Performed by: NURSE PRACTITIONER

## 2023-10-03 PROCEDURE — 99213 OFFICE O/P EST LOW 20 MIN: CPT | Performed by: NURSE PRACTITIONER

## 2023-10-03 RX ORDER — ENEMA 19; 7 G/133ML; G/133ML
1 ENEMA RECTAL DAILY PRN
Qty: 3 EACH | Refills: 1 | Status: SHIPPED | OUTPATIENT
Start: 2023-10-03 | End: 2024-01-08 | Stop reason: SDUPTHER

## 2023-10-03 ASSESSMENT — ENCOUNTER SYMPTOMS
ABDOMINAL PAIN: 1
FEVER: 0
ANAL BLEEDING: 0
CONSTIPATION: 1
VOMITING: 0
BLOOD IN STOOL: 0
DIARRHEA: 0
ABDOMINAL DISTENTION: 1
NAUSEA: 0

## 2023-10-03 NOTE — PROGRESS NOTES
Subjective   Patient ID: John Williamson is a 23 y.o. male who presents for swollen abdomen and history of constipation.  He lives in a group home but his parents visit him once a week.   He has been doing really well on his maintenance regime until this past week. All of a sudden, he is not stooling and his abdomen is swollen.     Dad Reports:  Two days ago, dad noticed that his abdomen was swollen.   10 days ago, it was fine.   Has been getting medications as regular but not pooping now.   NO illness or trauma.  No fever, no vomiting. Eating fine.   He had his daily suppository - he had it today.  No results or stool output.       Regular Medications  MiraLAX 1.5 caps twice a day.    Cyproheptadine   Motegrity 2 mg tablet  Linzess 145 mcg  MOM 15 ml twice a day as needed (Has not had to give)  Bisacodyl suppository daily - staff gives in the morning and then he goes to Day Program.  This has been working really well. Never decreased this dose as we discussed at the last visit.   Bisacodyl pill daily   Colace twice a day      He passes stool on his own but nobody records this. He wears underwear.    Mom does his laundry at home so she can monitor soiling and has not seen any except on rare occasion.   When ever they see him, they feel his abdomen and it feels pretty good.     Nutrition -   [employer] 4 a day is recommended but he does not always drink all of these. Amount is not documented by group home. Also eating and drinking well.   Good variety of foods.      EOE - takes Omeprazole. Last scope was Oct 2022 and was fine. We have discussed spacing out scopes unless symptomatic.     Review of Systems   Constitutional:  Negative for fever.   Gastrointestinal:  Positive for abdominal distention, abdominal pain and constipation. Negative for anal bleeding, blood in stool, diarrhea, nausea and vomiting.   Genitourinary: Negative.    Neurological:         Autism  Limited conversation  Cooperative   All other systems  reviewed and are negative.      Objective   Appears well.   HEENT - normal   Abdomen - tender, full to RLQ and LLQ. Otherwise soft. No guarding.  Rectum  - not examined  Lungs - clear to auscultation  Heart - S1S2, regular rate and rhythm.     Assessment/Plan        John Williamson is a 23 year old male with autism, EOE, history of poor weight gain and appetite.  He has had chronic constipation with a history of fecal soiling.     Today he is impacted with stool.   Adult enema today and tomorrow (24 hours apart).   Start MiraLAX 2.5 caps in the morning and 2.5 caps in the afternoon for the next three days.   All other medications stay the same.   I would expect a lot of stool output. If not, please let us know.   If not improving or not sure, we will get an xray next week.     Keep regular follow up as scheduled.          Regular Schedule -     CONTINUE  MiraLAX 1.5 caps mixed in 8 oz two times a day in water or Gatorade while awake (will verify this is what he is getting)  Motegrity 2 mg tablet   Linzess 145 mg   Colace twice a day  Bisacodyl once a day     AS NEEDED  Milk of Mag 15 ml twice a day if no BM in two days.      Nutrition  Continue [place] [employer] 4 times per day      EOE  Omeprazole 40 mg twice a day.   Re Scope in Late 2024 or Early 2025 of sooner if needed.     Office phone

## 2023-10-03 NOTE — PATIENT INSTRUCTIONS
John Williamson is a 23 year old male with autism, EOE, history of poor weight gain and appetite.  He has had chronic constipation with a history of fecal soiling.     Today he is impacted with stool.     Rescue -   Adult enema today and tomorrow (24 hours apart).   Start MiraLAX 2.5 caps in the morning and 2.5 caps in the afternoon for the next three days.   All other medications stay the same.   I would expect a lot of stool output. If not, please let us know.   If not improving or not sure, we will get an xray next week.     Keep regular follow up as scheduled.          Regular  Medication Schedule (from Previous visit)      CONTINUE  MiraLAX 1.5 caps mixed in 8 oz two times a day in water or Gatorade while awake (will verify this is what he is getting)  Motegrity 2 mg tablet   Linzess 145 mg   Colace twice a day  Bisacodyl once a day     AS NEEDED  Milk of Mag 15 ml twice a day if no BM in two days.      Nutrition  Continue [employer] 4 times per day      EOE  Omeprazole 40 mg twice a day.   Re Scope in Late 2024 or Early 2025 of sooner if needed.     Office phone

## 2023-10-04 ENCOUNTER — TELEPHONE (OUTPATIENT)
Dept: PEDIATRICS | Facility: HOSPITAL | Age: 23
End: 2023-10-04
Payer: MEDICAID

## 2023-10-09 ENCOUNTER — TELEPHONE (OUTPATIENT)
Dept: PEDIATRIC GASTROENTEROLOGY | Facility: HOSPITAL | Age: 23
End: 2023-10-09
Payer: MEDICAID

## 2023-10-09 NOTE — TELEPHONE ENCOUNTER
Spoke with mom, reports that she was calling to provide an update. Since visit she discovered that the group home had stopped giving John his daily suppositories. They have since restarted them and he is doing much better. He is having 2-3 stools per day and his abdomen is soft. Parents went over the weekend to give the enema and decided to give and they did not feel it was needed due to the progress he had made.

## 2023-10-09 NOTE — TELEPHONE ENCOUNTER
Shaniqua from [employer] needs clarification on orders for Fleet Enema ordered. Please call [phone]

## 2023-10-13 DIAGNOSIS — K20.0 EOSINOPHILIC ESOPHAGITIS: ICD-10-CM

## 2023-10-13 RX ORDER — OMEPRAZOLE 40 MG/1
40 CAPSULE, DELAYED RELEASE ORAL
Qty: 60 CAPSULE | Refills: 3 | Status: SHIPPED | OUTPATIENT
Start: 2023-10-13 | End: 2024-02-12 | Stop reason: SDUPTHER

## 2023-10-17 NOTE — TELEPHONE ENCOUNTER
Christie from [facility] called again - [phone]  Supposed to get fleet enemas Q24 hours, but mom said she doesn't think he needs these or the increased miralax. Nurse needs to talk to us.

## 2023-10-24 NOTE — TELEPHONE ENCOUNTER
Per letter Christie Cool wrote and faxed to [facility]:  Please administer one adult Fleet enema if John does not pass stool in 72 hours or he has fecal soiling that does not improve after 24 hours.   Please record the instances of enema use.

## 2023-11-01 ENCOUNTER — TELEPHONE (OUTPATIENT)
Dept: PEDIATRIC NEPHROLOGY | Facility: HOSPITAL | Age: 23
End: 2023-11-01
Payer: MEDICAID

## 2023-11-07 ENCOUNTER — TELEPHONE (OUTPATIENT)
Dept: PEDIATRIC GASTROENTEROLOGY | Facility: CLINIC | Age: 23
End: 2023-11-07
Payer: MEDICAID

## 2023-11-14 DIAGNOSIS — K59.09 CONSTIPATION, CHRONIC: Primary | ICD-10-CM

## 2023-11-14 DIAGNOSIS — F84.0 AUTISTIC DISORDER (HHS-HCC): ICD-10-CM

## 2023-11-14 RX ORDER — BISACODYL 10 MG/1
10 SUPPOSITORY RECTAL DAILY
Qty: 90 SUPPOSITORY | Refills: 3 | Status: SHIPPED | OUTPATIENT
Start: 2023-11-14 | End: 2024-11-13

## 2023-11-16 ENCOUNTER — OFFICE VISIT (OUTPATIENT)
Dept: PEDIATRIC NEUROLOGY | Facility: CLINIC | Age: 23
End: 2023-11-16
Payer: MEDICAID

## 2023-11-16 DIAGNOSIS — F84.0 AUTISTIC DISORDER (HHS-HCC): ICD-10-CM

## 2023-11-16 DIAGNOSIS — G40.802 OTHER EPILEPSY WITHOUT STATUS EPILEPTICUS, NOT INTRACTABLE (MULTI): Primary | ICD-10-CM

## 2023-11-16 PROCEDURE — 99213 OFFICE O/P EST LOW 20 MIN: CPT | Performed by: PSYCHIATRY & NEUROLOGY

## 2023-11-16 PROCEDURE — 1036F TOBACCO NON-USER: CPT | Performed by: PSYCHIATRY & NEUROLOGY
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
## 2023-11-16 NOTE — PATIENT INSTRUCTIONS
John is doing well. He has good seizure control on his present lamotrigine dose. Since he had breakthrough seizures in the past years, he needs to continue on the medication.     1. Continue present lamotrigine dose. A prescription will be sent when needed.  2. Call if any concerns regarding seizures.  3. Check a lamotrigine level yearly. Ordered placed in system.  4. Follow up in 1 year.
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
## 2023-11-24 DIAGNOSIS — L70.8 OTHER ACNE: ICD-10-CM

## 2023-11-24 RX ORDER — ADAPALENE 1 MG/G
GEL TOPICAL
Qty: 45 G | Refills: 0 | Status: SHIPPED | OUTPATIENT
Start: 2023-11-24 | End: 2024-01-24

## 2023-12-06 ENCOUNTER — TELEPHONE (OUTPATIENT)
Dept: PRIMARY CARE | Facility: CLINIC | Age: 23
End: 2023-12-06
Payer: MEDICAID

## 2023-12-06 NOTE — TELEPHONE ENCOUNTER
Dr. Peterson, Christie a nurse from [employer] [facility] left  voicemail message Christie said to call her back at [phone]. I see where Pt. has another PCP listed.

## 2023-12-11 DIAGNOSIS — K59.09 CONSTIPATION, CHRONIC: ICD-10-CM

## 2023-12-11 DIAGNOSIS — R62.7 FAILURE TO THRIVE IN ADULT: ICD-10-CM

## 2023-12-11 RX ORDER — CYPROHEPTADINE HYDROCHLORIDE 4 MG/1
8 TABLET ORAL NIGHTLY
Qty: 180 TABLET | Refills: 1 | Status: SHIPPED | OUTPATIENT
Start: 2023-12-11

## 2023-12-12 ENCOUNTER — TELEPHONE (OUTPATIENT)
Dept: PEDIATRIC GASTROENTEROLOGY | Facility: CLINIC | Age: 23
End: 2023-12-12
Payer: MEDICAID

## 2023-12-15 ENCOUNTER — TELEPHONE (OUTPATIENT)
Dept: PRIMARY CARE | Facility: CLINIC | Age: 23
End: 2023-12-15

## 2023-12-18 ENCOUNTER — TELEPHONE (OUTPATIENT)
Dept: PEDIATRIC GASTROENTEROLOGY | Facility: HOSPITAL | Age: 23
End: 2023-12-18
Payer: MEDICAID

## 2023-12-18 DIAGNOSIS — R21 RASH: Primary | ICD-10-CM

## 2023-12-18 DIAGNOSIS — R52 PAIN: ICD-10-CM

## 2023-12-18 RX ORDER — WITCH HAZEL 50 G/100G
CLOTH TOPICAL
COMMUNITY
Start: 2023-12-08

## 2023-12-18 RX ORDER — ZINC OXIDE 200 MG/G
OINTMENT TOPICAL AS NEEDED
Qty: 56.7 G | Refills: 3 | Status: SHIPPED | OUTPATIENT
Start: 2023-12-18 | End: 2023-12-19 | Stop reason: SDUPTHER

## 2023-12-18 RX ORDER — BISACODYL 5 MG
10 TABLET, DELAYED RELEASE (ENTERIC COATED) ORAL DAILY
Qty: 60 TABLET | Refills: 3 | Status: SHIPPED | OUTPATIENT
Start: 2023-12-18 | End: 2024-04-22 | Stop reason: SDUPTHER

## 2023-12-18 RX ORDER — POLYETHYLENE GLYCOL 3350 17 G/17G
POWDER, FOR SOLUTION ORAL
Qty: 2040 G | Refills: 3 | Status: SHIPPED | OUTPATIENT
Start: 2023-12-18

## 2023-12-18 RX ORDER — ACETAMINOPHEN 325 MG/1
650 TABLET ORAL EVERY 6 HOURS PRN
Qty: 112 TABLET | Refills: 1 | Status: SHIPPED | OUTPATIENT
Start: 2023-12-18 | End: 2023-12-19 | Stop reason: SDUPTHER

## 2023-12-18 RX ORDER — MINERAL OIL
ENEMA (ML) RECTAL
COMMUNITY
Start: 2023-10-05

## 2023-12-19 RX ORDER — ZINC OXIDE 200 MG/G
OINTMENT TOPICAL AS NEEDED
Qty: 56.7 G | Refills: 3 | Status: SHIPPED | OUTPATIENT
Start: 2023-12-19

## 2023-12-19 RX ORDER — ACETAMINOPHEN 325 MG/1
650 TABLET ORAL EVERY 6 HOURS PRN
Qty: 112 TABLET | Refills: 1 | Status: SHIPPED | OUTPATIENT
Start: 2023-12-19 | End: 2024-01-16

## 2023-12-22 ENCOUNTER — TELEMEDICINE (OUTPATIENT)
Dept: BEHAVIORAL HEALTH | Facility: CLINIC | Age: 23
End: 2023-12-22
Payer: MEDICAID

## 2023-12-22 DIAGNOSIS — F41.9 ANXIETY: ICD-10-CM

## 2023-12-22 DIAGNOSIS — G40.909 NONINTRACTABLE EPILEPSY WITHOUT STATUS EPILEPTICUS, UNSPECIFIED EPILEPSY TYPE (MULTI): ICD-10-CM

## 2023-12-22 DIAGNOSIS — F41.8 OTHER SPECIFIED ANXIETY DISORDERS: ICD-10-CM

## 2023-12-22 DIAGNOSIS — G47.9 SLEEP DISTURBANCES: ICD-10-CM

## 2023-12-22 DIAGNOSIS — F84.0 AUTISM SPECTRUM DISORDER (HHS-HCC): Primary | ICD-10-CM

## 2023-12-22 DIAGNOSIS — R46.81 OBSESSIVE-COMPULSIVE BEHAVIOR: ICD-10-CM

## 2023-12-22 PROCEDURE — 99214 OFFICE O/P EST MOD 30 MIN: CPT | Performed by: PSYCHIATRY & NEUROLOGY

## 2023-12-22 RX ORDER — NALTREXONE HYDROCHLORIDE 50 MG/1
TABLET, FILM COATED ORAL
Qty: 24 TABLET | Refills: 3 | Status: SHIPPED | OUTPATIENT
Start: 2023-12-22 | End: 2024-05-06 | Stop reason: SDUPTHER

## 2023-12-22 RX ORDER — CLONIDINE HYDROCHLORIDE 0.1 MG/1
0.05 TABLET ORAL 3 TIMES DAILY
Qty: 47 TABLET | Refills: 3 | Status: SHIPPED | OUTPATIENT
Start: 2023-12-22 | End: 2024-06-03 | Stop reason: SDUPTHER

## 2023-12-22 RX ORDER — HYDROXYZINE HYDROCHLORIDE 10 MG/1
TABLET, FILM COATED ORAL
Qty: 62 TABLET | Refills: 3 | Status: SHIPPED | OUTPATIENT
Start: 2023-12-22 | End: 2024-05-28 | Stop reason: SDUPTHER

## 2023-12-22 RX ORDER — MIRTAZAPINE 45 MG/1
45 TABLET, FILM COATED ORAL NIGHTLY
Qty: 31 TABLET | Refills: 3 | Status: SHIPPED | OUTPATIENT
Start: 2023-12-22 | End: 2024-06-03 | Stop reason: SDUPTHER

## 2023-12-22 NOTE — PROGRESS NOTES
"Outpatient Psychiatry    A HIPAA-compliant interactive audio and video telecommunication system which permits real time communications between the patient (at the originating site) and provider (at the distant site) was utilized to provide this telehealth service.     The patient, family, caregivers and guardian (as appropriate) have provided consent on this date to conduct treatment via this telehealth service.  The patient's identity and physical location were verified at the time of this visit.      Present for appointment: edward Lopez, Wakie  (Anastasiia) and Wakie DSP.    SUBJECTIVE    John has generally been doing well since our last check-in (4/27/23).      He has had some ongoing problems with constipation and is often refusing the daily suppository.  There were also some issues with (former) staff at the house falsely marking the suppository as administered when it had not been.  GI has recently increased his bisacodyl and Miralax; now taking Motegrity, Linzess, MOM twice a day, Bisacodyl 10mg per day, Docusate, Miralax 2 caps twice per day.     He has not had any seizures in the interim.  He remains on LTG 150mg twice daily.  He saw Dr. Salazar on 11/16/23 for routine follow-up -- no treatment changes, follow-up in one year.    He's been doing well at [facility].  He sees parents weekly.  Edward notes that John seems to be having increased issues with getting \"stuck\" in tasks; not necessarily engaged or preoccupied with rituals or stereotypic behaviors or speech, just seems \"zoned out.\"  It is taking him excessively long periods of time to get through meals.  He's not engaging in bizarre posturing or other strange behaviors.  He's not having significant problems with anxiety or aggression.    No problems with sleep noted.  Appetite is fair; remains on cyproheptadine for appetite stimulation; weight has been stable.  Still taking PPI for EoE.  No apparent dysphagia.  Next EGD will " be in 2024.     Review of Systems   Constitutional:  Positive for activity change.   Gastrointestinal:  Positive for constipation.   Neurological:  Negative for seizures.   Psychiatric/Behavioral:  Positive for behavioral problems. Negative for self-injury and sleep disturbance.      MEDICATION HISTORY  Clomipramine  Depakote  Risperidone - gynecomastia and weight gain  Ziprasidone    CURRENT MEDICATIONS    Current Outpatient Medications:     acetaminophen (TylenoL) 325 mg tablet, Take 2 tablets (650 mg) by mouth every 6 hours if needed for mild pain (1 - 3) for up to 28 days., Disp: 112 tablet, Rfl: 1    adapalene (Differin) 0.1 % gel, APPLY A THIN FILM ON FACE EVERY DAY AT BEDTIME., Disp: 45 g, Rfl: 0    Banophen 25 mg tablet, Take 1 tablet (25 mg) by mouth as needed at bedtime for itching or allergies., Disp: 90 tablet, Rfl: 1    benzoyl peroxide (Benzac AC) 10 % external wash, Apply topically once daily., Disp: 187 g, Rfl: 11    bisacodyl (Dulcolax) 5 mg EC tablet, Take 2 tablets (10 mg) by mouth once daily. Do not crush, chew, or split., Disp: 60 tablet, Rfl: 3    bisacodyl (Dulcolax, bisacodyl,) 10 mg suppository, Insert 1 suppository (10 mg) into the rectum once daily., Disp: 90 suppository, Rfl: 3    bismuth subsalicylate (Pepto Bismol) 262 mg/15 mL suspension, Take by mouth., Disp: , Rfl:     Blistex Medicated 0.6-0.5-1.1-0.5 % ointment ointment, , Disp: , Rfl:     Chest Congestion Relief DM  mg/5 mL syrup, , Disp: , Rfl:     cholecalciferol (Vitamin D-3) 50 MCG (2000 UT) tablet, Take 1 tablet (50 mcg) by mouth once daily., Disp: , Rfl:     cholecalciferol (Vitamin D3) 50 MCG (2000 UT) tablet, TAKE 1 TABLET BY MOUTH DAILY., Disp: 90 tablet, Rfl: 1    ciclopirox (Penlac) 8 % solution, Apply topically once daily at bedtime. Apply to affected toenail(s) and adjacent skin once daily in combination with weekly nail trimming and periodic nail debridement. Remove with alcohol every 7 days; continue therapy  until nail clearance., Disp: 6.6 mL, Rfl: 1    clindamycin (Cleocin T) 1 % lotion, APPLY TOPICALLY TO FACE, CHEST, AND BACK ONCE DAILY AFTER WASHING., Disp: 60 mL, Rfl: 3    cloNIDine (Catapres) 0.1 mg tablet, Take by mouth., Disp: , Rfl:     cyproheptadine (Periactin) 4 mg tablet, Take 2 tablets (8 mg) by mouth once daily at bedtime., Disp: 180 tablet, Rfl: 1    fluoride, sodium, (Prevident 5000 Plus) 1.1 % dental cream, Apply topically., Disp: , Rfl:     fluoride, sodium, (PreviDent) 1.1 % gel, Apply 1 Application to teeth 2 times a day., Disp: 56 g, Rfl: 11    food supplemt, lactose-reduced (Boost Plus) 0.06 gram- 1.5 kcal/mL liquid, Take by mouth., Disp: , Rfl:     hydrocortisone 1 % cream, Apply topically twice a day., Disp: , Rfl:     hydrOXYzine HCL (Atarax) 10 mg tablet, Take 1 tablet (10 mg) by mouth twice a day., Disp: , Rfl:     hydrOXYzine HCL (Atarax) 10 mg tablet, Take 1 tablet (10 mg) by mouth 2 times a day., Disp: 180 tablet, Rfl: 1    lamoTRIgine (LaMICtal) 150 mg tablet, Take 1 tablet (150 mg) by mouth 2 times a day., Disp: , Rfl:     Linzess 145 mcg capsule, Take by mouth once daily., Disp: , Rfl:     liver oil-zinc oxide (Desitin) ointment, Apply topically if needed for irritation. Can be applied as needed for any anogenital irritation as barrier ointment to prevent skin breakdown., Disp: 56.7 g, Rfl: 3    magnesium hydroxide (Milk of Magnesia) 400 mg/5 mL suspension, Take by mouth., Disp: , Rfl:     magnesium oxide 500 mg tablet, TAKE 1 TABLET BY MOUTH DAILY., Disp: 90 tablet, Rfl: 1    Medi-Pads 50 % pads, medicated pads, , Disp: , Rfl:     melatonin 3 mg tablet, Take 1 tablet (3 mg) by mouth once daily at bedtime., Disp: 90 tablet, Rfl: 3    menthol 5.8 mg lozenge, Take 1 Units by mouth every 4 hours if needed (cough)., Disp: 150 lozenge, Rfl: 3    mirtazapine (Remeron) 45 mg tablet, Take 1 tablet (45 mg) by mouth once daily at bedtime., Disp: , Rfl:     Motegrity 2 mg tablet, Take 1 tablet by  mouth once daily., Disp: , Rfl:     naltrexone (Depade) 50 mg tablet, Take by mouth twice a day., Disp: , Rfl:     nutritional supplement-caloric (Benecalorie) 7.5 kcal/mL liquid, Take by mouth., Disp: , Rfl:     omeprazole (PriLOSEC) 40 mg DR capsule, Take 1 capsule (40 mg) by mouth 2 times a day before meals., Disp: 60 capsule, Rfl: 3    pe glycol 3350-peg 400, bulk, (Lip Balm Base, Bulk,) 1.3 gm/3 mL ointment, USE AS DIRECTED., Disp: , Rfl:     polyethylene glycol (Glycolax, Miralax) 17 gram/dose powder, TAKE 2 CAPFULS TWICE DAILY, Disp: 2040 g, Rfl: 3    Ready-To-Use Enema, min oil, enema, , Disp: , Rfl:     sodium phosphates (Fleet Enema) 19-7 gram/118 mL enema enema, Insert 118 mL (1 enema) into the rectum once daily as needed for constipation., Disp: 3 each, Rfl: 1    sodium phosphates 19-7 gram/197 mL enema, Insert into the rectum., Disp: , Rfl:     Stool Softener 100 mg capsule, Take 1 capsule (100 mg) by mouth 2 times a day., Disp: , Rfl:     triamcinolone (Kenalog) 0.025 % cream, APPLY TOPICALLY TO THE AFFECTED AREA BY ADHESIVE (LEFT ELBOW) TWICE A DAY UNTIL RESOLVED FOR ADHESIVE INDUCED DERMATITIS., Disp: 15 g, Rfl: 0    Triple Antibiotic ointment, APPLY TOPICALLY TO CUTS/SCRAPES/SKIN ABRASIONS EVERY 12 HOURS AS NEEDED TO PREVENT SKIN INFECTIONS, Disp: , Rfl:     white petrolatum (Aquaphor Healing) 41 % ointment ointment, apply to lips every 4 hours while awake and PRN, Disp: , Rfl:     witch hazeL 20 % pads, medicated, USE AS DIRECTED ON PACKAGE, Disp: , Rfl:     witch hazel-glycerin-aloe vera 50 % pads, medicated, Apply topically., Disp: , Rfl:     SOCIAL HISTORY  Living situation [facility] with 2 male house-mates   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   Bx Specialist ?   Nicotine None   Alcohol None   Other drugs None     OBJECTIVE    Lab Results   Component Value Date    HGB  "13.7 10/12/2022     10/12/2022    NEUTROABS 2.18 06/02/2022    GLUCOSE 77 10/13/2022     10/13/2022    K 4.2 10/13/2022    CO2 26 10/13/2022    CALCIUM 9.3 10/13/2022    CREATININE 1.11 10/13/2022    AST 16 06/02/2022    ALT 11 06/02/2022    TSH 1.51 06/02/2022    FREET4 1.13 06/02/2022    CHOL 173 06/02/2022    LDLF 103 06/02/2022    TRIG 71 06/02/2022    VITD25 48 06/02/2022     Therapeutic Drug Monitoring  05/02/2022: Lamotrigine level = 10.4 [2.5-15.0] (300mg/day)  10/14/2020: Lamotrigine level = 7.1 [2.5-15.0] (300mg/day)    Electrocardiograms  None in chart    MENTAL STATUS EXAM  General/Appearance: Appropriate dress/hygiene/grooming.  Attitude/Behavior: Calm/pleasant. Superficially cooperative.  Speech/Communication: Single word responses. Answers \"yes\" to most questions.  Motor: No abnormal or involuntary movements observed.  Gait: Ambulates w/o difficulty.  Mood: Calm/pleasant.  Affect: Congruent.  Thought processes: Organized/coherent.  Thought content: Talks about O[place].  Perception: Does not appear to be experiencing or responding to hallucinations.  Sensorium/Cognition: Oriented to self and surroundings. Distracted. Minimal interest in participating. Listens quietly while others are talking.  Memory: Not directly assessed at this time.  Insight: Minimal.  Judgment: Behaviorally under control.    ASSESSMENT  John has been having more difficulty with transitioning lately.  Can't totally rule out some mild degree of catatonia.  He has had a good response to naltrexone for managing repetitive/stereotypic behaviors in the past and the last dose adjustment was made in February 2020.  We will try increasing his dose by another 12.5mg/day.  We could consider a trial of memantine for some of these catatonic-like behaviors, perhaps in place of hydroxyzine, at our next visit.    PROBLEM LIST  ASD  O/C behaviors  Anxiety  Epilepsy    PLAN  -- Increase naltrexone to 25mg every morning and 12.5mg at " bedtime for OCD  -- Continue hydroxyzine 10mg BID (AM & 16:00) for anxiety  -- Continue mirtazapine 45mg at bedtime for anxiety and OCD  -- Continue clonidine 0.05mg TID for anxiety  -- Follow up 3 months    Amrit Nguyen MD    Prep time on date of the patient encounter: 5 minutes   Time spent directly with patient/family/caregiver: 30 minutes   Additional time spent on patient care activities: 0 minutes   Documentation time: 5 minutes   Other time spent: 0 minutes   Total time on date of patient encounter: 40 minutes

## 2023-12-22 NOTE — PATIENT INSTRUCTIONS
We will try increasing naltrexone to address some issues with increased O/C behaviors.  > Increase naltrexone to 25mg in the morning and continue 12.5mg dose at bedtime.    Call [phone] to schedule virtual follow-up in 3 months (March 2024).

## 2023-12-23 ASSESSMENT — ENCOUNTER SYMPTOMS
ACTIVITY CHANGE: 1
CONSTIPATION: 1
SLEEP DISTURBANCE: 0
SEIZURES: 0

## 2024-01-08 ENCOUNTER — TELEPHONE (OUTPATIENT)
Dept: PRIMARY CARE | Facility: CLINIC | Age: 24
End: 2024-01-08

## 2024-01-08 ENCOUNTER — TELEPHONE (OUTPATIENT)
Dept: PEDIATRIC GASTROENTEROLOGY | Facility: HOSPITAL | Age: 24
End: 2024-01-08
Payer: MEDICAID

## 2024-01-08 DIAGNOSIS — K56.41 FECAL IMPACTION IN RECTUM (MULTI): ICD-10-CM

## 2024-01-08 RX ORDER — ENEMA 19; 7 G/133ML; G/133ML
ENEMA RECTAL
Qty: 15 EACH | Refills: 3 | Status: SHIPPED | OUTPATIENT
Start: 2024-01-08

## 2024-01-24 DIAGNOSIS — L70.8 OTHER ACNE: ICD-10-CM

## 2024-01-24 RX ORDER — ADAPALENE 1 MG/G
GEL TOPICAL
Qty: 45 G | Refills: 3 | Status: SHIPPED | OUTPATIENT
Start: 2024-01-24

## 2024-02-12 DIAGNOSIS — K20.0 EOSINOPHILIC ESOPHAGITIS: ICD-10-CM

## 2024-02-12 RX ORDER — OMEPRAZOLE 40 MG/1
40 CAPSULE, DELAYED RELEASE ORAL
Qty: 60 CAPSULE | Refills: 3 | Status: SHIPPED | OUTPATIENT
Start: 2024-02-12 | End: 2024-06-10

## 2024-02-19 DIAGNOSIS — L70.8 OTHER ACNE: ICD-10-CM

## 2024-02-19 RX ORDER — CLINDAMYCIN PHOSPHATE 10 UG/ML
LOTION TOPICAL
Qty: 60 ML | Refills: 0 | Status: SHIPPED | OUTPATIENT
Start: 2024-02-19

## 2024-02-26 DIAGNOSIS — K59.09 CHRONIC CONSTIPATION: ICD-10-CM

## 2024-02-26 DIAGNOSIS — K56.41 FECAL IMPACTION IN RECTUM (MULTI): ICD-10-CM

## 2024-02-27 DIAGNOSIS — K59.00 CONSTIPATION, UNSPECIFIED CONSTIPATION TYPE: Primary | ICD-10-CM

## 2024-02-27 RX ORDER — DOCUSATE SODIUM 100 MG
100 CAPSULE ORAL 2 TIMES DAILY
Qty: 180 CAPSULE | Refills: 0 | Status: SHIPPED | OUTPATIENT
Start: 2024-02-27 | End: 2024-04-18 | Stop reason: SDUPTHER

## 2024-03-27 DIAGNOSIS — G40.909 NONINTRACTABLE EPILEPSY WITHOUT STATUS EPILEPTICUS, UNSPECIFIED EPILEPSY TYPE (MULTI): Primary | ICD-10-CM

## 2024-03-28 RX ORDER — LAMOTRIGINE 150 MG/1
TABLET ORAL 2 TIMES DAILY
Qty: 60 TABLET | Refills: 3 | Status: SHIPPED | OUTPATIENT
Start: 2024-03-28

## 2024-04-18 DIAGNOSIS — E55.9 VITAMIN D DEFICIENCY: ICD-10-CM

## 2024-04-18 DIAGNOSIS — K59.00 CONSTIPATION, UNSPECIFIED CONSTIPATION TYPE: ICD-10-CM

## 2024-04-18 RX ORDER — DOCUSATE SODIUM 100 MG/1
100 CAPSULE, LIQUID FILLED ORAL 2 TIMES DAILY
Qty: 180 CAPSULE | Refills: 0 | Status: SHIPPED | OUTPATIENT
Start: 2024-04-18

## 2024-04-18 RX ORDER — CHOLECALCIFEROL (VITAMIN D3) 50 MCG
2000 TABLET ORAL DAILY
Qty: 90 TABLET | Refills: 2 | Status: SHIPPED | OUTPATIENT
Start: 2024-04-18

## 2024-04-22 ENCOUNTER — TELEPHONE (OUTPATIENT)
Dept: BEHAVIORAL HEALTH | Facility: CLINIC | Age: 24
End: 2024-04-22
Payer: MEDICAID

## 2024-04-22 DIAGNOSIS — K59.09 CONSTIPATION, CHRONIC: ICD-10-CM

## 2024-04-22 RX ORDER — BISACODYL 5 MG
10 TABLET, DELAYED RELEASE (ENTERIC COATED) ORAL DAILY
Qty: 60 TABLET | Refills: 3 | Status: SHIPPED | OUTPATIENT
Start: 2024-04-22

## 2024-04-25 ENCOUNTER — TELEPHONE (OUTPATIENT)
Dept: BEHAVIORAL HEALTH | Facility: CLINIC | Age: 24
End: 2024-04-25
Payer: MEDICAID

## 2024-05-06 ENCOUNTER — PHARMACY VISIT (OUTPATIENT)
Dept: PHARMACY | Facility: CLINIC | Age: 24
End: 2024-05-06
Payer: MEDICAID

## 2024-05-06 ENCOUNTER — TELEPHONE (OUTPATIENT)
Dept: BEHAVIORAL HEALTH | Facility: CLINIC | Age: 24
End: 2024-05-06
Payer: MEDICAID

## 2024-05-06 DIAGNOSIS — R46.81 OBSESSIVE-COMPULSIVE BEHAVIOR: ICD-10-CM

## 2024-05-06 PROCEDURE — RXMED WILLOW AMBULATORY MEDICATION CHARGE

## 2024-05-06 RX ORDER — NALTREXONE HYDROCHLORIDE 50 MG/1
TABLET, FILM COATED ORAL
Qty: 68 TABLET | Refills: 0 | Status: SHIPPED | OUTPATIENT
Start: 2024-05-06

## 2024-05-28 DIAGNOSIS — F41.8 OTHER SPECIFIED ANXIETY DISORDERS: ICD-10-CM

## 2024-05-28 RX ORDER — HYDROXYZINE HYDROCHLORIDE 10 MG/1
TABLET, FILM COATED ORAL
Qty: 62 TABLET | Refills: 3 | Status: SHIPPED | OUTPATIENT
Start: 2024-05-28 | End: 2024-06-03 | Stop reason: SDUPTHER

## 2024-06-03 ENCOUNTER — TELEPHONE (OUTPATIENT)
Dept: BEHAVIORAL HEALTH | Facility: CLINIC | Age: 24
End: 2024-06-03
Payer: MEDICAID

## 2024-06-03 DIAGNOSIS — F41.8 OTHER SPECIFIED ANXIETY DISORDERS: ICD-10-CM

## 2024-06-03 DIAGNOSIS — R46.81 OBSESSIVE-COMPULSIVE BEHAVIOR: ICD-10-CM

## 2024-06-03 RX ORDER — HYDROXYZINE HYDROCHLORIDE 10 MG/1
TABLET, FILM COATED ORAL
Qty: 60 TABLET | Refills: 5 | Status: SHIPPED | OUTPATIENT
Start: 2024-06-03

## 2024-06-03 RX ORDER — CLONIDINE HYDROCHLORIDE 0.1 MG/1
0.05 TABLET ORAL 3 TIMES DAILY
Qty: 45 TABLET | Refills: 5 | Status: SHIPPED | OUTPATIENT
Start: 2024-06-03

## 2024-06-03 RX ORDER — MIRTAZAPINE 45 MG/1
45 TABLET, FILM COATED ORAL NIGHTLY
Qty: 30 TABLET | Refills: 5 | Status: SHIPPED | OUTPATIENT
Start: 2024-06-03

## 2024-06-09 DIAGNOSIS — K20.0 EOSINOPHILIC ESOPHAGITIS: ICD-10-CM

## 2024-06-10 RX ORDER — OMEPRAZOLE 40 MG/1
CAPSULE, DELAYED RELEASE ORAL
Qty: 180 CAPSULE | Refills: 1 | Status: SHIPPED | OUTPATIENT
Start: 2024-06-10

## 2024-06-17 DIAGNOSIS — K59.09 CONSTIPATION, CHRONIC: ICD-10-CM

## 2024-06-17 DIAGNOSIS — K59.09 CHRONIC CONSTIPATION: ICD-10-CM

## 2024-06-17 DIAGNOSIS — R62.7 FAILURE TO THRIVE IN ADULT: ICD-10-CM

## 2024-06-17 DIAGNOSIS — G40.909 NONINTRACTABLE EPILEPSY WITHOUT STATUS EPILEPTICUS, UNSPECIFIED EPILEPSY TYPE (MULTI): ICD-10-CM

## 2024-06-17 RX ORDER — POLYETHYLENE GLYCOL 3350 17 G/17G
POWDER, FOR SOLUTION ORAL
Qty: 510 G | Refills: 11 | Status: SHIPPED | OUTPATIENT
Start: 2024-06-17

## 2024-06-18 RX ORDER — CYPROHEPTADINE HYDROCHLORIDE 4 MG/1
TABLET ORAL
Qty: 178 TABLET | Refills: 1 | Status: SHIPPED | OUTPATIENT
Start: 2024-06-18

## 2024-06-18 RX ORDER — LINACLOTIDE 145 UG/1
CAPSULE, GELATIN COATED ORAL
Qty: 90 CAPSULE | Refills: 0 | Status: SHIPPED | OUTPATIENT
Start: 2024-06-18

## 2024-06-18 RX ORDER — LAMOTRIGINE 150 MG/1
TABLET ORAL 2 TIMES DAILY
Qty: 60 TABLET | Refills: 5 | Status: SHIPPED | OUTPATIENT
Start: 2024-06-18

## 2024-06-20 DIAGNOSIS — F84.0 AUTISTIC DISORDER (HHS-HCC): ICD-10-CM

## 2024-06-20 DIAGNOSIS — K59.00 CONSTIPATION, UNSPECIFIED CONSTIPATION TYPE: ICD-10-CM

## 2024-06-20 DIAGNOSIS — G47.00 INSOMNIA, UNSPECIFIED TYPE: ICD-10-CM

## 2024-06-20 DIAGNOSIS — R46.81 OBSESSIVE-COMPULSIVE BEHAVIOR: ICD-10-CM

## 2024-06-20 RX ORDER — BACLOFEN 20 MG
1 TABLET ORAL DAILY
Qty: 90 TABLET | Refills: 1 | Status: SHIPPED | OUTPATIENT
Start: 2024-06-20

## 2024-06-20 RX ORDER — TALC
3 POWDER (GRAM) TOPICAL NIGHTLY
Qty: 90 TABLET | Refills: 3 | Status: SHIPPED | OUTPATIENT
Start: 2024-06-20

## 2024-06-20 RX ORDER — DOCUSATE SODIUM 100 MG/1
100 CAPSULE, LIQUID FILLED ORAL 2 TIMES DAILY
Qty: 180 CAPSULE | Refills: 0 | Status: SHIPPED | OUTPATIENT
Start: 2024-06-20

## 2024-06-21 RX ORDER — NALTREXONE HYDROCHLORIDE 50 MG/1
TABLET, FILM COATED ORAL
Qty: 68 TABLET | Refills: 5 | Status: SHIPPED | OUTPATIENT
Start: 2024-06-21

## 2024-06-25 ENCOUNTER — TELEPHONE (OUTPATIENT)
Dept: PEDIATRIC GASTROENTEROLOGY | Facility: CLINIC | Age: 24
End: 2024-06-25
Payer: MEDICAID

## 2024-06-25 NOTE — TELEPHONE ENCOUNTER
PA request says not worked for 4 days attempted PA via cover my meds received the following determination:   Electronic prior authorization not supported as a duplicate PA was found. If requesting a dose increase or requesting above quantity limits, please submit via other methods.

## 2024-06-26 ENCOUNTER — TELEPHONE (OUTPATIENT)
Dept: PEDIATRIC GASTROENTEROLOGY | Facility: CLINIC | Age: 24
End: 2024-06-26
Payer: MEDICAID

## 2024-06-26 ENCOUNTER — TELEPHONE (OUTPATIENT)
Dept: PEDIATRIC ENDOCRINOLOGY | Facility: HOSPITAL | Age: 24
End: 2024-06-26
Payer: MEDICAID

## 2024-07-10 ENCOUNTER — TELEPHONE (OUTPATIENT)
Dept: PEDIATRIC NEUROLOGY | Facility: CLINIC | Age: 24
End: 2024-07-10
Payer: MEDICAID

## 2024-07-10 NOTE — TELEPHONE ENCOUNTER
Mom calling to follow up after John had an ophthalmic exam completed yesterday under anesthesia. Mom states the ophthalmologist found abnormalities in his optic nerve and has concerns about fluid in his brain. They suggested mom contact his neurologist to discuss an LP.    [phone]

## 2024-07-10 NOTE — TELEPHONE ENCOUNTER
Called and spoke with mom. No new medications or supplements. Possibly recent adjustments to psych meds. Dr Vo manages these. Mom is going to send a list of his medications to [facility] for us.     Dr Salazar aware.
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
## 2024-07-11 DIAGNOSIS — K59.09 CONSTIPATION, CHRONIC: ICD-10-CM

## 2024-07-12 DIAGNOSIS — Q07.8 ANOMALOUS OPTIC NERVE (MULTI): Primary | ICD-10-CM

## 2024-07-12 NOTE — TELEPHONE ENCOUNTER
Received message from Dr Janay Haywood. She is placing orders for MRI/MRV with peds sedation. LP will also be completed while sedated. Asked RN to notify family of the plan and that they will receive a call from sedation unit to schedule this.

## 2024-07-12 NOTE — TELEPHONE ENCOUNTER
Called and spoke with mom. Reviewed the plan with her for MRI/MRV and LP under sedation. Let her know the sedation unit will be calling her to schedule this. Mom verbalized understanding and states no further questions at this time.

## 2024-07-12 NOTE — TELEPHONE ENCOUNTER
Reminder email sent to Dr Salazar.    Mom sent complete med list yesterday, forwarded to Dr Salazar.

## 2024-07-18 ENCOUNTER — TELEPHONE (OUTPATIENT)
Dept: PEDIATRIC ENDOCRINOLOGY | Facility: HOSPITAL | Age: 24
End: 2024-07-18
Payer: MEDICAID

## 2024-07-18 DIAGNOSIS — Q07.8 ANOMALOUS OPTIC NERVE (MULTI): Primary | ICD-10-CM

## 2024-07-18 DIAGNOSIS — K59.09 CHRONIC CONSTIPATION: ICD-10-CM

## 2024-07-19 DIAGNOSIS — K59.09 CONSTIPATION, CHRONIC: ICD-10-CM

## 2024-07-19 DIAGNOSIS — F84.0 AUTISTIC DISORDER (HHS-HCC): ICD-10-CM

## 2024-07-20 RX ORDER — BISACODYL 10 MG/1
10 SUPPOSITORY RECTAL DAILY
Qty: 60 SUPPOSITORY | Refills: 3 | Status: SHIPPED | OUTPATIENT
Start: 2024-07-20 | End: 2025-07-20

## 2024-07-24 RX ORDER — PRUCALOPRIDE 2 MG/1
1 TABLET, FILM COATED ORAL DAILY
Qty: 269 TABLET | Refills: 0 | Status: SHIPPED | OUTPATIENT
Start: 2024-07-24

## 2024-07-24 NOTE — TELEPHONE ENCOUNTER
Dad called saying a formula refill is needed for the Group Home.  He said [facility]. See mom's note earlier.

## 2024-07-30 ENCOUNTER — TELEPHONE (OUTPATIENT)
Dept: PEDIATRIC GASTROENTEROLOGY | Facility: CLINIC | Age: 24
End: 2024-07-30
Payer: MEDICAID

## 2024-08-06 ENCOUNTER — TELEPHONE (OUTPATIENT)
Dept: PEDIATRIC GASTROENTEROLOGY | Facility: CLINIC | Age: 24
End: 2024-08-06
Payer: MEDICAID

## 2024-08-06 NOTE — TELEPHONE ENCOUNTER
Mom called because [employer] has not responded per their request for boost, please terminate with [employer] and find a supplier that will carry the boost

## 2024-08-12 ENCOUNTER — TELEPHONE (OUTPATIENT)
Dept: PEDIATRIC GASTROENTEROLOGY | Facility: HOSPITAL | Age: 24
End: 2024-08-12

## 2024-08-12 NOTE — TELEPHONE ENCOUNTER
Needs nutrition supplement from [facility] - we sent CMN and documentation on 8/7 but mom says they do not call them back. Out of supplement for 3 weeks.

## 2024-08-14 DIAGNOSIS — K59.00 CONSTIPATION, UNSPECIFIED CONSTIPATION TYPE: ICD-10-CM

## 2024-08-14 RX ORDER — DOCUSATE SODIUM 100 MG/1
100 CAPSULE, LIQUID FILLED ORAL 2 TIMES DAILY
Qty: 180 CAPSULE | Refills: 0 | Status: SHIPPED | OUTPATIENT
Start: 2024-08-14

## 2024-08-23 ENCOUNTER — TELEPHONE (OUTPATIENT)
Dept: PEDIATRIC GASTROENTEROLOGY | Facility: HOSPITAL | Age: 24
End: 2024-08-23
Payer: MEDICAID

## 2024-09-03 DIAGNOSIS — Q07.8 ANOMALOUS OPTIC NERVE (MULTI): ICD-10-CM

## 2024-09-10 DIAGNOSIS — K59.09 CHRONIC CONSTIPATION: ICD-10-CM

## 2024-09-11 RX ORDER — LINACLOTIDE 145 UG/1
CAPSULE, GELATIN COATED ORAL
Qty: 90 CAPSULE | Refills: 0 | Status: SHIPPED | OUTPATIENT
Start: 2024-09-11

## 2024-09-13 ENCOUNTER — LAB (OUTPATIENT)
Dept: LAB | Facility: LAB | Age: 24
End: 2024-09-13
Payer: MEDICAID

## 2024-09-13 DIAGNOSIS — Q07.8 ANOMALOUS OPTIC NERVE (MULTI): ICD-10-CM

## 2024-09-13 LAB
ERYTHROCYTE [DISTWIDTH] IN BLOOD BY AUTOMATED COUNT: 15.4 % (ref 11.5–14.5)
HCT VFR BLD AUTO: 47.6 % (ref 41–52)
HGB BLD-MCNC: 14.7 G/DL (ref 13.5–17.5)
INR PPP: 1 (ref 0.9–1.1)
MCH RBC QN AUTO: 22.4 PG (ref 26–34)
MCHC RBC AUTO-ENTMCNC: 30.9 G/DL (ref 32–36)
MCV RBC AUTO: 73 FL (ref 80–100)
NRBC BLD-RTO: 0 /100 WBCS (ref 0–0)
PLATELET # BLD AUTO: 233 X10*3/UL (ref 150–450)
PROTHROMBIN TIME: 11.7 SECONDS (ref 9.8–12.8)
RBC # BLD AUTO: 6.55 X10*6/UL (ref 4.5–5.9)
WBC # BLD AUTO: 7.7 X10*3/UL (ref 4.4–11.3)

## 2024-09-13 PROCEDURE — 36415 COLL VENOUS BLD VENIPUNCTURE: CPT

## 2024-09-13 PROCEDURE — 85027 COMPLETE CBC AUTOMATED: CPT

## 2024-09-13 PROCEDURE — 85610 PROTHROMBIN TIME: CPT

## 2024-09-16 ENCOUNTER — ANESTHESIA EVENT (OUTPATIENT)
Dept: RADIOLOGY | Facility: HOSPITAL | Age: 24
End: 2024-09-16
Payer: MEDICAID

## 2024-09-16 ENCOUNTER — ANESTHESIA (OUTPATIENT)
Dept: RADIOLOGY | Facility: HOSPITAL | Age: 24
End: 2024-09-16
Payer: MEDICAID

## 2024-09-16 ENCOUNTER — APPOINTMENT (OUTPATIENT)
Dept: RADIOLOGY | Facility: HOSPITAL | Age: 24
End: 2024-09-16
Payer: MEDICAID

## 2024-09-16 ENCOUNTER — HOSPITAL ENCOUNTER (OUTPATIENT)
Dept: RADIOLOGY | Facility: HOSPITAL | Age: 24
Discharge: HOME | End: 2024-09-16
Payer: MEDICAID

## 2024-09-16 ENCOUNTER — TELEPHONE (OUTPATIENT)
Dept: PEDIATRIC NEUROLOGY | Facility: CLINIC | Age: 24
End: 2024-09-16

## 2024-09-16 VITALS
RESPIRATION RATE: 12 BRPM | HEART RATE: 94 BPM | DIASTOLIC BLOOD PRESSURE: 83 MMHG | OXYGEN SATURATION: 100 % | SYSTOLIC BLOOD PRESSURE: 147 MMHG

## 2024-09-16 DIAGNOSIS — H47.11 PAPILLEDEMA ASSOCIATED WITH INCREASED INTRACRANIAL PRESSURE: ICD-10-CM

## 2024-09-16 DIAGNOSIS — Q07.8 ANOMALOUS OPTIC NERVE: ICD-10-CM

## 2024-09-16 DIAGNOSIS — G80.9: ICD-10-CM

## 2024-09-16 LAB
APPEARANCE CSF: CLEAR
BASOPHILS NFR CSF MANUAL: 0 %
BLASTS CSF MANUAL: 0 %
COLOR CSF: COLORLESS
COLOR SPUN CSF: COLORLESS
EOSINOPHIL NFR CSF MANUAL: 0 %
GLUCOSE CSF-MCNC: 57 MG/DL (ref 40–70)
IMM GRANULOCYTES NFR CSF: 0 %
LYMPHOCYTES NFR CSF MANUAL: 71 % (ref 28–96)
MONOS+MACROS NFR CSF MANUAL: 3 % (ref 16–56)
NEUTS SEG NFR CSF MANUAL: 26 % (ref 0–5)
OTHER CELLS NFR CSF MANUAL: 0 %
PLASMA CELLS NFR CSF MICRO: 0 %
PROT CSF-MCNC: 29 MG/DL (ref 15–45)
RBC # CSF AUTO: 1000 /UL (ref 0–5)
TOTAL CELLS COUNTED CSF: 31
TUBE # CSF: ABNORMAL
WBC # CSF AUTO: 1 /UL (ref 1–5)

## 2024-09-16 PROCEDURE — 7100000010 HC PHASE TWO TIME - EACH INCREMENTAL 1 MINUTE

## 2024-09-16 PROCEDURE — 2500000004 HC RX 250 GENERAL PHARMACY W/ HCPCS (ALT 636 FOR OP/ED): Mod: SE | Performed by: STUDENT IN AN ORGANIZED HEALTH CARE EDUCATION/TRAINING PROGRAM

## 2024-09-16 PROCEDURE — 70551 MRI BRAIN STEM W/O DYE: CPT

## 2024-09-16 PROCEDURE — 87070 CULTURE OTHR SPECIMN AEROBIC: CPT

## 2024-09-16 PROCEDURE — 7100000009 HC PHASE TWO TIME - INITIAL BASE CHARGE

## 2024-09-16 PROCEDURE — 3700000001 HC GENERAL ANESTHESIA TIME - INITIAL BASE CHARGE

## 2024-09-16 PROCEDURE — A62328 PR DIAGNOSTIC LUMBAR SPINAL PUNCTURE W/FLUOR OR CT: Performed by: ANESTHESIOLOGY

## 2024-09-16 PROCEDURE — A70551 CHG MRI BRAIN: Performed by: ANESTHESIOLOGY

## 2024-09-16 PROCEDURE — 84157 ASSAY OF PROTEIN OTHER: CPT

## 2024-09-16 PROCEDURE — 3700000002 HC GENERAL ANESTHESIA TIME - EACH INCREMENTAL 1 MINUTE

## 2024-09-16 PROCEDURE — 2500000005 HC RX 250 GENERAL PHARMACY W/O HCPCS: Mod: SE | Performed by: STUDENT IN AN ORGANIZED HEALTH CARE EDUCATION/TRAINING PROGRAM

## 2024-09-16 PROCEDURE — 2500000005 HC RX 250 GENERAL PHARMACY W/O HCPCS: Mod: SE | Performed by: ANESTHESIOLOGY

## 2024-09-16 PROCEDURE — 7100000001 HC RECOVERY ROOM TIME - INITIAL BASE CHARGE

## 2024-09-16 PROCEDURE — 7100000002 HC RECOVERY ROOM TIME - EACH INCREMENTAL 1 MINUTE

## 2024-09-16 PROCEDURE — 82945 GLUCOSE OTHER FLUID: CPT

## 2024-09-16 PROCEDURE — 62328 DX LMBR SPI PNXR W/FLUOR/CT: CPT

## 2024-09-16 PROCEDURE — 89051 BODY FLUID CELL COUNT: CPT

## 2024-09-16 PROCEDURE — 70544 MR ANGIOGRAPHY HEAD W/O DYE: CPT

## 2024-09-16 PROCEDURE — 62328 DX LMBR SPI PNXR W/FLUOR/CT: CPT | Performed by: RADIOLOGY

## 2024-09-16 RX ORDER — HYDROMORPHONE HYDROCHLORIDE 1 MG/ML
0.4 INJECTION, SOLUTION INTRAMUSCULAR; INTRAVENOUS; SUBCUTANEOUS EVERY 5 MIN PRN
Status: DISCONTINUED | OUTPATIENT
Start: 2024-09-16 | End: 2024-09-17 | Stop reason: HOSPADM

## 2024-09-16 RX ORDER — ONDANSETRON HYDROCHLORIDE 2 MG/ML
4 INJECTION, SOLUTION INTRAVENOUS ONCE AS NEEDED
Status: DISCONTINUED | OUTPATIENT
Start: 2024-09-16 | End: 2024-09-17 | Stop reason: HOSPADM

## 2024-09-16 RX ORDER — MIDAZOLAM HYDROCHLORIDE 1 MG/ML
INJECTION INTRAMUSCULAR; INTRAVENOUS
Status: DISCONTINUED
Start: 2024-09-16 | End: 2024-09-16 | Stop reason: HOSPADM

## 2024-09-16 RX ORDER — PROPOFOL 10 MG/ML
INJECTION, EMULSION INTRAVENOUS
Status: COMPLETED
Start: 2024-09-16 | End: 2024-09-16

## 2024-09-16 RX ORDER — SODIUM CHLORIDE, SODIUM LACTATE, POTASSIUM CHLORIDE, CALCIUM CHLORIDE 600; 310; 30; 20 MG/100ML; MG/100ML; MG/100ML; MG/100ML
100 INJECTION, SOLUTION INTRAVENOUS CONTINUOUS
Status: DISCONTINUED | OUTPATIENT
Start: 2024-09-16 | End: 2024-09-17 | Stop reason: HOSPADM

## 2024-09-16 RX ORDER — MIDAZOLAM HYDROCHLORIDE 1 MG/ML
INJECTION, SOLUTION INTRAMUSCULAR; INTRAVENOUS AS NEEDED
Status: DISCONTINUED | OUTPATIENT
Start: 2024-09-16 | End: 2024-09-16

## 2024-09-16 RX ORDER — FENTANYL CITRATE 50 UG/ML
INJECTION, SOLUTION INTRAMUSCULAR; INTRAVENOUS
Status: COMPLETED
Start: 2024-09-16 | End: 2024-09-16

## 2024-09-16 RX ORDER — HYDROMORPHONE HYDROCHLORIDE 0.2 MG/ML
0.2 INJECTION INTRAMUSCULAR; INTRAVENOUS; SUBCUTANEOUS EVERY 5 MIN PRN
Status: DISCONTINUED | OUTPATIENT
Start: 2024-09-16 | End: 2024-09-17 | Stop reason: HOSPADM

## 2024-09-16 RX ORDER — NORETHINDRONE AND ETHINYL ESTRADIOL 0.5-0.035
KIT ORAL CONTINUOUS PRN
Status: DISCONTINUED | OUTPATIENT
Start: 2024-09-16 | End: 2024-09-16

## 2024-09-16 RX ORDER — NORETHINDRONE AND ETHINYL ESTRADIOL 0.5-0.035
KIT ORAL
Status: DISCONTINUED
Start: 2024-09-16 | End: 2024-09-16 | Stop reason: HOSPADM

## 2024-09-16 RX ORDER — ROCURONIUM BROMIDE 10 MG/ML
INJECTION, SOLUTION INTRAVENOUS AS NEEDED
Status: DISCONTINUED | OUTPATIENT
Start: 2024-09-16 | End: 2024-09-16

## 2024-09-16 RX ORDER — LIDOCAINE HYDROCHLORIDE 10 MG/ML
2 INJECTION, SOLUTION EPIDURAL; INFILTRATION; INTRACAUDAL; PERINEURAL ONCE
Status: COMPLETED | OUTPATIENT
Start: 2024-09-16 | End: 2024-09-16

## 2024-09-16 RX ORDER — PROPOFOL 10 MG/ML
INJECTION, EMULSION INTRAVENOUS CONTINUOUS PRN
Status: DISCONTINUED | OUTPATIENT
Start: 2024-09-16 | End: 2024-09-16

## 2024-09-16 RX ORDER — METOCLOPRAMIDE HYDROCHLORIDE 5 MG/ML
10 INJECTION INTRAMUSCULAR; INTRAVENOUS ONCE AS NEEDED
Status: DISCONTINUED | OUTPATIENT
Start: 2024-09-16 | End: 2024-09-17 | Stop reason: HOSPADM

## 2024-09-16 RX ORDER — LIDOCAINE IN NACL,ISO-OSMOT/PF 30 MG/3 ML
0.1 SYRINGE (ML) INJECTION ONCE
Status: DISCONTINUED | OUTPATIENT
Start: 2024-09-16 | End: 2024-09-17 | Stop reason: HOSPADM

## 2024-09-16 RX ORDER — FENTANYL CITRATE 50 UG/ML
INJECTION, SOLUTION INTRAMUSCULAR; INTRAVENOUS AS NEEDED
Status: DISCONTINUED | OUTPATIENT
Start: 2024-09-16 | End: 2024-09-16

## 2024-09-16 RX ORDER — HYDROMORPHONE HYDROCHLORIDE 0.2 MG/ML
0.1 INJECTION INTRAMUSCULAR; INTRAVENOUS; SUBCUTANEOUS EVERY 5 MIN PRN
Status: DISCONTINUED | OUTPATIENT
Start: 2024-09-16 | End: 2024-09-17 | Stop reason: HOSPADM

## 2024-09-16 NOTE — ANESTHESIA POSTPROCEDURE EVALUATION
Patient: John Williamson    Procedure Summary       Date: 09/16/24 Room / Location: [facility]    Anesthesia Start: 1223 Anesthesia Stop: 1353    Procedure: MR VENOGRAPHY INTRACRANIAL WO CONTRAST Diagnosis:       Anomalous optic nerve (Multi)      (papilledema)    Scheduled Providers: Zach Yuan MD Responsible Provider: Zach Yuan MD    Anesthesia Type: general ASA Status: 3            Anesthesia Type: general    Vitals Value Taken Time   /86 09/16/24 1449   Temp 36 09/16/24 1449   Pulse 85 09/16/24 1449   Resp 20 09/16/24 1449   SpO2 100 09/16/24 1449       Anesthesia Post Evaluation    Patient location during evaluation: PACU  Patient participation: complete - patient participated  Level of consciousness: awake and alert  Pain management: adequate  Airway patency: patent  Cardiovascular status: blood pressure returned to baseline and acceptable  Respiratory status: acceptable  Hydration status: acceptable  Postoperative Nausea and Vomiting: none        No notable events documented.     Please advise on behalf of Trudy Wallis

## 2024-09-16 NOTE — ANESTHESIA POSTPROCEDURE EVALUATION
Patient: John Williamson    Procedure Summary       Date: 09/16/24 Room / Location: [facility]    Anesthesia Start: 1356 Anesthesia Stop: 1447    Procedure: FL FLUOROSCOPIC GUIDED LUMBAR PUNCTURE Diagnosis:       Anomalous optic nerve (Multi)      (papilledema)    Scheduled Providers: Zach Yuan MD Responsible Provider: Zach Yuan MD    Anesthesia Type: general ASA Status: Not recorded            Anesthesia Type: general    Vitals Value Taken Time   /86 09/16/24 1448   Temp 36 09/16/24 1448   Pulse 87 09/16/24 1448   Resp 20 09/16/24 1448   SpO2 100 09/16/24 1448       Anesthesia Post Evaluation    Patient location during evaluation: PACU  Patient participation: complete - patient participated  Level of consciousness: awake and alert  Pain management: adequate  Airway patency: patent  Cardiovascular status: acceptable  Respiratory status: acceptable  Hydration status: acceptable  Postoperative Nausea and Vomiting: none        No notable events documented.

## 2024-09-16 NOTE — ANESTHESIA PREPROCEDURE EVALUATION
Patient: John Williamson    Procedure Information       Anesthesia Start Date/Time: 09/16/24 1223    Scheduled providers: Zach Yuan MD    Procedure: MR VENOGRAPHY INTRACRANIAL W AND WO CONTRAST    Location: [facility]            Relevant Problems   Neuro   (+) Anxiety disorder   (+) Autism spectrum disorder (HHS-HCC)   (+) Delay in development   (+) Epilepsy (Multi)      GI   (+) IBS (irritable bowel syndrome)       Clinical information reviewed:                   NPO Detail:  No data recorded     Physical Exam    Airway  Mallampati: III  TM distance: >3 FB  Neck ROM: full     Cardiovascular - normal exam  Rhythm: regular  Rate: normal     Dental - normal exam     Pulmonary - normal exam     Abdominal   Abdomen: soft             Anesthesia Plan    History of general anesthesia?: yes  History of complications of general anesthesia?: no    ASA 3     general - patient intubated from prior mri      Postoperative administration of opioids is intended.  Anesthetic plan and risks discussed with mother and father.    Plan discussed with resident and attending.

## 2024-09-16 NOTE — POST-PROCEDURE NOTE
Interventional Radiology Brief Postprocedure Note    Attending: Florin Valadez MD     Assistant: Kristopher Bird MD    Diagnosis: Papilledema    Description of procedure: Under direct fluoroscopic guidance with general anesthesia a lumbar puncture was performed using a 20g spinal needle. 6 ml of clear CSF was collected in 1 tubes and was sent to lab. The patient tolerated the procedure well without any immediate or clinically apparent complications. Please see radiology report for full details.     Anesthesia:  General    Complications: None    Estimated Blood Loss: minimal    Medications (Filter: Administrations occurring from 1501 to 1501 on 09/16/24) As of 09/16/24 1501      None          See detailed result report with images in PACS.    The patient tolerated the procedure well without incident or complication and is in stable condition.

## 2024-09-16 NOTE — PERIOPERATIVE NURSING NOTE
PT arrived to PACU after having an MRI and lumbar puncture done in stable condition and on RA. Because of pts autism and adhd and slight fear of DR's, nursing assessment not able to be fully completed. Standing by pt, no auditory wheezing or stridor could be hear. Per mom pt has a cough at baseline that is not productive. RN did not have pt participate in orientation questions since he doesn't answer answer yes or no, answers are usually just yes. HR was WNL on monitor as well as O2 saturations on RA. RN was able to do an assessment of LP site with dad at pt's site. There was no signs of bleeding, site was clean with a Band-Aid present. Band-aid was dry to the touch. Pt had a 20g IV in the left hand that was connected to LR when presented to recovery, fluids were stopped and IV removed with no complications, pt tolerated removal well with no incidents, dad held pts other hand. Mom and dad assisted with pt getting dress, RN put in transport. Pt was transported via wheelchair with mom and dad at side with transporter to [facility] lobby where they were  parked.

## 2024-09-16 NOTE — ANESTHESIA PREPROCEDURE EVALUATION
Patient: John Williamson    Procedure Information       Anesthesia Start Date/Time: 09/16/24 1223    Scheduled providers: Zach Yuan MD    Procedure: MR VENOGRAPHY INTRACRANIAL W AND WO CONTRAST    Location: [facility]            Relevant Problems   Neuro   (+) Anxiety disorder   (+) Autism spectrum disorder (HHS-HCC)   (+) Delay in development   (+) Epilepsy (Multi)      GI   (+) IBS (irritable bowel syndrome)       Clinical information reviewed:                   NPO Detail:  No data recorded     Physical Exam    Airway  Mallampati: III  TM distance: >3 FB  Neck ROM: full     Cardiovascular - normal exam  Rhythm: regular  Rate: normal     Dental - normal exam     Pulmonary - normal exam     Abdominal   Abdomen: soft             Anesthesia Plan    History of general anesthesia?: yes  History of complications of general anesthesia?: no    ASA 3     general     inhalational induction   Postoperative administration of opioids is intended.  Anesthetic plan and risks discussed with mother and father.    Plan discussed with resident and attending.

## 2024-09-16 NOTE — ANESTHESIA PREPROCEDURE EVALUATION
Patient: John Williamson    Procedure Information       Anesthesia Start Date/Time: 09/16/24 1356    Scheduled providers: Zach Yuan MD    Procedure: FL FLUOROSCOPIC GUIDED LUMBAR PUNCTURE    Location: [facility]            Relevant Problems   Neuro   (+) Anxiety disorder   (+) Autism spectrum disorder (HHS-HCC)   (+) Delay in development   (+) Epilepsy (Multi)      GI   (+) IBS (irritable bowel syndrome)       Clinical information reviewed:                   NPO Detail:  No data recorded     Physical Exam    Airway  Mallampati: III  TM distance: >3 FB  Neck ROM: full     Cardiovascular   Rhythm: regular  Rate: normal     Dental - normal exam     Pulmonary - normal exam     Abdominal            Anesthesia Plan    History of general anesthesia?: yes  History of complications of general anesthesia?: no    ASA 3     general     Patient did not smoke on day of procedure.    intravenous induction   Anesthetic plan and risks discussed with mother and father.    Plan discussed with resident and attending.

## 2024-09-17 ENCOUNTER — TELEPHONE (OUTPATIENT)
Dept: PEDIATRIC NEUROLOGY | Facility: CLINIC | Age: 24
End: 2024-09-17
Payer: MEDICAID

## 2024-09-17 ENCOUNTER — TELEPHONE (OUTPATIENT)
Dept: PEDIATRIC GASTROENTEROLOGY | Facility: CLINIC | Age: 24
End: 2024-09-17
Payer: MEDICAID

## 2024-09-17 NOTE — TELEPHONE ENCOUNTER
Called and spoke with mom and reviewed all findings with her. She has no further questions or concerns. She will call the office if she needs anything.

## 2024-09-17 NOTE — TELEPHONE ENCOUNTER
Spoke with Dr. Salazar. Opening pressures were normal. MRI of the brain was good, showed possible slow drainage on one side but this is a normal variant. CSF labs all normal. Most likely the optic nerve never developed correctly and this is what was seen.

## 2024-09-20 ENCOUNTER — TELEPHONE (OUTPATIENT)
Dept: PEDIATRIC GASTROENTEROLOGY | Facility: HOSPITAL | Age: 24
End: 2024-09-20
Payer: MEDICAID

## 2024-09-20 LAB
BACTERIA CSF CULT: NORMAL
GRAM STN SPEC: NORMAL
GRAM STN SPEC: NORMAL

## 2024-09-23 LAB
BACTERIA CSF CULT: NORMAL
GRAM STN SPEC: NORMAL
GRAM STN SPEC: NORMAL

## 2024-09-23 RX ORDER — BISACODYL 5 MG
TABLET, DELAYED RELEASE (ENTERIC COATED) ORAL
Qty: 60 TABLET | Refills: 3 | Status: SHIPPED | OUTPATIENT
Start: 2024-09-23

## 2024-10-02 ENCOUNTER — APPOINTMENT (OUTPATIENT)
Dept: PEDIATRIC GASTROENTEROLOGY | Facility: CLINIC | Age: 24
End: 2024-10-02
Payer: MEDICAID

## 2024-10-02 VITALS
DIASTOLIC BLOOD PRESSURE: 66 MMHG | SYSTOLIC BLOOD PRESSURE: 116 MMHG | HEIGHT: 67 IN | BODY MASS INDEX: 18.93 KG/M2 | HEART RATE: 89 BPM | WEIGHT: 120.59 LBS

## 2024-10-02 DIAGNOSIS — R62.7 FAILURE TO THRIVE IN ADULT: ICD-10-CM

## 2024-10-02 DIAGNOSIS — K20.0 EOSINOPHILIC ESOPHAGITIS: ICD-10-CM

## 2024-10-02 DIAGNOSIS — F84.0 AUTISTIC DISORDER (HHS-HCC): Primary | ICD-10-CM

## 2024-10-02 DIAGNOSIS — K59.00 CONSTIPATION, UNSPECIFIED CONSTIPATION TYPE: ICD-10-CM

## 2024-10-02 DIAGNOSIS — F84.0 AUTISTIC DISORDER (HHS-HCC): ICD-10-CM

## 2024-10-02 DIAGNOSIS — K59.09 CONSTIPATION, CHRONIC: ICD-10-CM

## 2024-10-02 DIAGNOSIS — K59.09 CHRONIC CONSTIPATION: Primary | ICD-10-CM

## 2024-10-02 PROCEDURE — 3008F BODY MASS INDEX DOCD: CPT | Performed by: NURSE PRACTITIONER

## 2024-10-02 PROCEDURE — 99214 OFFICE O/P EST MOD 30 MIN: CPT | Performed by: NURSE PRACTITIONER

## 2024-10-02 RX ORDER — PRUCALOPRIDE 2 MG/1
1 TABLET, FILM COATED ORAL DAILY
Qty: 269 TABLET | Refills: 0 | Status: SHIPPED | OUTPATIENT
Start: 2024-10-02

## 2024-10-02 RX ORDER — POLYETHYLENE GLYCOL 3350 17 G/17G
POWDER, FOR SOLUTION ORAL
Qty: 510 G | Refills: 11 | Status: SHIPPED | OUTPATIENT
Start: 2024-10-02

## 2024-10-02 RX ORDER — DOCUSATE SODIUM 100 MG/1
100 CAPSULE, LIQUID FILLED ORAL 2 TIMES DAILY
Qty: 180 CAPSULE | Refills: 0 | Status: SHIPPED | OUTPATIENT
Start: 2024-10-02

## 2024-10-02 RX ORDER — BISACODYL 10 MG/1
10 SUPPOSITORY RECTAL DAILY
Qty: 60 SUPPOSITORY | Refills: 3 | Status: SHIPPED | OUTPATIENT
Start: 2024-10-02 | End: 2025-10-02

## 2024-10-02 RX ORDER — OMEPRAZOLE 40 MG/1
40 CAPSULE, DELAYED RELEASE ORAL
Qty: 180 CAPSULE | Refills: 1 | Status: SHIPPED | OUTPATIENT
Start: 2024-10-02 | End: 2025-10-02

## 2024-10-02 ASSESSMENT — ENCOUNTER SYMPTOMS
ROS SKIN COMMENTS: DRY LIPS
CONSTIPATION: 1
LOSS OF SENSATION IN FEET: 0
DEPRESSION: 0
OCCASIONAL FEELINGS OF UNSTEADINESS: 0

## 2024-10-02 NOTE — PATIENT INSTRUCTIONS
Assessment/Plan        John Williamson is a 24 year old male with autism, EOE, history of poor weight gain and appetite.  He has had chronic constipation with a history of fecal soiling.     We discussed alternative to Bisacodyl Suppository - could use Bisacodyl enema (mini 1.25 oz)   - should have the same effect.     No suppository on Saturdays so family can follow up on Sunday to see if he passed stool.     Medication   - Linzess option of increasing the dose to 290 mcg (to see if he would go without the enema).     Meet with Travon Gant regarding adult transition and adult care (EGD, etc).     Weight gain is good - continue two Boost Per day     GI follow up in one year and Dr. Cool in between.     Will need EGD in 2025 to follow up EOE     CONTINUE  MiraLAX 1.5 caps mixed in 8 oz two times a day in water or Gatorade while awake (will verify this is what he is getting)  Motegrity 2 mg tablet   Linzess 145 mg   Colace twice a day  Bisacodyl suppository once a day     AS NEEDED  Milk of Mag 15 ml twice a day if no BM in two days.      Nutrition  Continue Boost Plus twice a day     EOE  Omeprazole 40 mg twice a day.   Re Scope in Late 2024 or Early 2025 of sooner if needed.     Office phone [phone]

## 2024-10-02 NOTE — PROGRESS NOTES
Pediatric Gastroenterology, Hepatology & Nutrition        Follow Up Visit Patient ID: John Williamson is a 24 y.o. male who presents for history of constipation.  He lives in a group home but his parents visit him once a week (on Sunday Mornings). He has been doing really well on his maintenance regime for stooling.  Given his history of fecal impaction, he has been on a vigorous routine for stooling. He lives in a group home with two other gentleman.   He had someone that assists with giving him a daily enema.      Regular Medications  MiraLAX 1.5 caps twice a day.    Motegrity 2 mg tablet  Linzess 145 mcg  MOM 15 ml twice a day as needed (not sure - if he has had to get this)    Bisacodyl suppository daily - staff gives in the afternoon after Day Program.  This has been working really well. Never decreased this dose as we discussed at the last visit. Concern today is that dad is wondering if there is an alternative way to give this medication that does not involve a caregiver using a gloved finger. Concern regarding fingernails, etc that might make it more uncomfortable for John.     Bisacodyl pill daily   Colace twice a day      Cyproheptadine - dad reports he does not believe he is currently taking this medication.     He passes stool on his own but nobody records this. Nobody sees his stool.   He wears underwear.    Mom does his laundry at home so she can monitor soiling . No soiling is noted.  When ever they see him, they feel his abdomen and it feels pretty good.  They feel it every Sunday.     Nutrition -   Changed to Boost Plus.     EOE - takes Omeprazole. Last scope was Oct 2022 and was fine. We have discussed spacing out scopes unless symptomatic.   Continues to eat very slowly.     Review of Systems   Gastrointestinal:  Positive for constipation.        Constipation under control   Skin:         Dry lips     Neurological:         Autism  Limited discussion but answers questions    All other systems reviewed and are negative.       Objective   Appears well.   HEENT - normal   Abdomen - tender, full to RLQ and LLQ. Otherwise soft. No guarding.  Rectum  - not examined  Lungs - clear to auscultation  Heart - S1S2, regular rate and rhythm.     Assessment/Plan        John Williamson is a 24 year old male with autism, EOE, history of poor weight gain and appetite.  He has had chronic constipation with a history of fecal soiling.     We discussed alternative to Bisacodyl Suppository (to avoid gloved finger application)  - could use Bisacodyl enema (mini 1.25 oz)   - should have the same effect.     No suppository on Saturdays so family can follow up on Sunday to see if he passed stool.     Medication   - Linzess option of increasing the dose to 290 mcg (to see if he would go without the enema).     Meet with Travon Shi regarding adult transition and adult care (EGD, etc).     Weight gain is good - continue two Boost Per day     GI follow up in one year and Dr. Cool in between.     Will need EGD in 2025 to follow up EOE     CONTINUE  MiraLAX 1.5 caps mixed in 8 oz two times a day in water or Gatorade while awake (will verify this is what he is getting)  Motegrity 2 mg tablet   Linzess 145 mg   Colace twice a day  Bisacodyl suppository once a day     AS NEEDED  Milk of Mag 15 ml twice a day if no BM in two days.      Nutrition  Continue Boost Plus twice a day     EOE  Omeprazole 40 mg twice a day.   Re Scope in Late 2024 or Early 2025 of sooner if needed.     Office phone

## 2024-10-04 RX ORDER — GUAR GUM
1 POWDER (GRAM) ORAL 2 TIMES DAILY
Qty: 2640 ML | Refills: 11 | Status: SHIPPED | OUTPATIENT
Start: 2024-10-04 | End: 2025-10-04

## 2024-10-14 ENCOUNTER — TELEPHONE (OUTPATIENT)
Dept: PEDIATRIC GASTROENTEROLOGY | Facility: HOSPITAL | Age: 24
End: 2024-10-14
Payer: MEDICAID

## 2024-11-04 ENCOUNTER — TELEPHONE (OUTPATIENT)
Dept: PEDIATRIC GASTROENTEROLOGY | Facility: CLINIC | Age: 24
End: 2024-11-04
Payer: MEDICAID

## 2024-11-08 DIAGNOSIS — F84.0 AUTISTIC DISORDER (HHS-HCC): ICD-10-CM

## 2024-11-08 DIAGNOSIS — F41.8 OTHER SPECIFIED ANXIETY DISORDERS: ICD-10-CM

## 2024-11-08 DIAGNOSIS — R46.81 OBSESSIVE-COMPULSIVE BEHAVIOR: ICD-10-CM

## 2024-11-08 DIAGNOSIS — G40.909 NONINTRACTABLE EPILEPSY WITHOUT STATUS EPILEPTICUS, UNSPECIFIED EPILEPSY TYPE (MULTI): ICD-10-CM

## 2024-11-08 RX ORDER — BACLOFEN 20 MG
1 TABLET ORAL DAILY
Qty: 90 TABLET | Refills: 1 | Status: SHIPPED | OUTPATIENT
Start: 2024-11-08

## 2024-11-08 RX ORDER — LAMOTRIGINE 150 MG/1
150 TABLET ORAL 2 TIMES DAILY
Qty: 60 TABLET | Refills: 5 | Status: SHIPPED | OUTPATIENT
Start: 2024-11-08 | End: 2025-05-07

## 2024-11-09 RX ORDER — HYDROXYZINE HYDROCHLORIDE 10 MG/1
TABLET, FILM COATED ORAL
Qty: 60 TABLET | Refills: 0 | Status: SHIPPED | OUTPATIENT
Start: 2024-11-09

## 2024-11-09 RX ORDER — CLONIDINE HYDROCHLORIDE 0.1 MG/1
TABLET ORAL
Qty: 45 TABLET | Refills: 0 | Status: SHIPPED | OUTPATIENT
Start: 2024-11-09

## 2024-11-09 RX ORDER — MIRTAZAPINE 45 MG/1
45 TABLET, FILM COATED ORAL NIGHTLY
Qty: 30 TABLET | Refills: 0 | Status: SHIPPED | OUTPATIENT
Start: 2024-11-09

## 2024-11-13 DIAGNOSIS — K59.09 CONSTIPATION, CHRONIC: ICD-10-CM

## 2024-11-18 RX ORDER — BISACODYL 5 MG
5 TABLET, DELAYED RELEASE (ENTERIC COATED) ORAL DAILY
Qty: 30 TABLET | Refills: 3 | Status: SHIPPED | OUTPATIENT
Start: 2024-11-18

## 2024-11-19 ENCOUNTER — APPOINTMENT (OUTPATIENT)
Dept: PEDIATRIC NEUROLOGY | Facility: CLINIC | Age: 24
End: 2024-11-19
Payer: MEDICAID

## 2024-11-19 VITALS — WEIGHT: 123.68 LBS | HEIGHT: 67 IN | BODY MASS INDEX: 19.41 KG/M2

## 2024-11-19 DIAGNOSIS — F84.0 AUTISM SPECTRUM DISORDER (HHS-HCC): ICD-10-CM

## 2024-11-19 DIAGNOSIS — G40.909 NONINTRACTABLE EPILEPSY WITHOUT STATUS EPILEPTICUS, UNSPECIFIED EPILEPSY TYPE (MULTI): Primary | ICD-10-CM

## 2024-11-19 PROCEDURE — 3008F BODY MASS INDEX DOCD: CPT | Performed by: PSYCHIATRY & NEUROLOGY

## 2024-11-19 PROCEDURE — 99213 OFFICE O/P EST LOW 20 MIN: CPT | Performed by: PSYCHIATRY & NEUROLOGY

## 2024-11-19 NOTE — PATIENT INSTRUCTIONS
John is doing well.  He has no seizures or lamotrigine side effects.    No change in medication.  Call when refills are needed.  Call if problems.  Check a lamotrigine level when he has a blood draw.  Follow up in 1 year.

## 2024-11-19 NOTE — LETTER
November 24, 2024     Momo Dc MD  [address]  [facility] [address]  [facility] [address]    Patient: John Williamson   YOB: 2000   Date of Visit: 11/19/2024       Dear Dr. Momo Dc MD:    Thank you for referring John Williamson to me for evaluation. Below are my notes for this consultation.  If you have questions, please do not hesitate to call me. I look forward to following your patient along with you.       Sincerely,     Guy Salazar MD      CC: No Recipients  ______________________________________________________________________________________    Subjective  John Williamson is a 24 y.o.  man with Asd and epilepsy  HPI  John is an 24 year old boy with ASD and epilepsy. He had a seizure on 6/4/17 after missing 2 days of his lamotrigine. He had a presumed seizure in May 2019 when riding on the van to school (lethargic and sleepy on arrival with no clinical event witnessed). No further seizures have occurred. Semiology is becoming limp and then shaking/quivering for a few minutes followed by tiredness. He takes lamotrigine 150 mg bid. He has no medication side effects. Level was 10.4 ugm/ml in May 2022..     John lives at [facility]. He attends [facility] [employer] day program 5 days weekly. His OCD behaviors (tapping) are seen when with his father and do not have a major impact on his daily functioning. He voices obscenities periodically (echolalia versus tic).  Psychiatry care is through Dr. Nguyen.     He takes clonidine 0.05 mg tid, naltrexone 12.5 mg bid, hydroxyzine 10 mg bid for anxiety, and mirtazapine 45 mg at bedtime. Weight gain is being addressed by GI. He is on a dietary supplement. He takes Motegrity, Linzess and Bisacodyl suppository for constipation management.     John visits with his parents every Sunday. He is eating and sleeping OK.      Review of Systems  All other systems have been reviewed with no other  pertinent positives except GI issues and anemia. He takes omeprazole for GERD.  He had a normal brain MRI and lumbar puncture with normal opening pressure for papilledema concern.    Objective  Neurological Exam  Mental Status  Awake and alert.  Sits quietly  Good mood.    Motor   No abnormal involuntary movements.    Physical Exam  Constitutional:       General: He is awake.   Neurological:      Mental Status: He is alert.       Assessment/Plan    John is doing well.  He has no seizures or lamotrigine side effects.    No change in medication.  Call when refills are needed.  Call if problems.  Check a lamotrigine level when he has a blood draw.  Follow up in 1 year.  Follow up in

## 2024-11-19 NOTE — PROGRESS NOTES
Subjective   John Williamson is a 24 y.o.  man with Asd and epilepsy  HPI  John is an 24 year old boy with ASD and epilepsy. He had a seizure on 6/4/17 after missing 2 days of his lamotrigine. He had a presumed seizure in May 2019 when riding on the van to school (lethargic and sleepy on arrival with no clinical event witnessed). No further seizures have occurred. Semiology is becoming limp and then shaking/quivering for a few minutes followed by tiredness. He takes lamotrigine 150 mg bid. He has no medication side effects. Level was 10.4 ugm/ml in May 2022..     oJhn lives at [facility]. He attends [employer] day program 5 days weekly. His OCD behaviors (tapping) are seen when with his father and do not have a major impact on his daily functioning. He voices obscenities periodically (echolalia versus tic).  Psychiatry care is through Dr. Nguyen.     He takes clonidine 0.05 mg tid, naltrexone 12.5 mg bid, hydroxyzine 10 mg bid for anxiety, and mirtazapine 45 mg at bedtime. Weight gain is being addressed by GI. He is on a dietary supplement. He takes Motegrity, Linzess and Bisacodyl suppository for constipation management.     John visits with his parents every Sunday. He is eating and sleeping OK.      Review of Systems  All other systems have been reviewed with no other pertinent positives except GI issues and anemia. He takes omeprazole for GERD.  He had a normal brain MRI and lumbar puncture with normal opening pressure for papilledema concern.    Objective   Neurological Exam  Mental Status  Awake and alert.  Sits quietly  Good mood.    Motor   No abnormal involuntary movements.    Physical Exam  Constitutional:       General: He is awake.   Neurological:      Mental Status: He is alert.       Assessment/Plan     John is doing well.  He has no seizures or lamotrigine side effects.    No change in medication.  Call when refills are needed.  Call if problems.  Check a lamotrigine  level when he has a blood draw.  Follow up in 1 year.  Follow up in

## 2024-11-26 ENCOUNTER — TELEPHONE (OUTPATIENT)
Dept: PEDIATRIC GASTROENTEROLOGY | Facility: CLINIC | Age: 24
End: 2024-11-26
Payer: MEDICAID

## 2024-12-04 ENCOUNTER — APPOINTMENT (OUTPATIENT)
Dept: RADIOLOGY | Facility: HOSPITAL | Age: 24
End: 2024-12-04
Payer: MEDICAID

## 2024-12-04 ENCOUNTER — TELEPHONE (OUTPATIENT)
Dept: PEDIATRIC GASTROENTEROLOGY | Facility: HOSPITAL | Age: 24
End: 2024-12-04
Payer: MEDICAID

## 2024-12-04 DIAGNOSIS — K20.0 EOSINOPHILIC ESOPHAGITIS: Primary | ICD-10-CM

## 2024-12-04 ASSESSMENT — ENCOUNTER SYMPTOMS: SLEEP DISTURBANCE: 0

## 2024-12-04 NOTE — PROGRESS NOTES
Outpatient Adult IDD Psychiatry    A HIPAA-compliant interactive audio and video telecommunication system which permits real time communications between the patient (at the originating site) and provider (at the distant site) was utilized to provide this telehealth service.     The patient, family, caregivers and guardian (as appropriate) have provided consent to conduct treatment via this telehealth service.      The patient's identity and physical location were verified at the time of this visit.     Present for appointment: John, mom/guardian (Ivonne), and [facility] DSP (Juan Ramon).    Appointment location: Home.   [address] [place] [address]     SUBJECTIVE    Last visit with John was one year ago (December 2023).    Generally doing quite well in the interim.    New agency ([facility]) took over the home in March.    Attending [employer] for day program activity Monday through Friday.    Sees parents weekly on Sunday.  Had a nice outing in September for his birthday.    Appetite has been good and weight has been stable or even slightly increased.    Seems to be transitioning through his day well, without significant ritualized or repetitive behaviors.    He's not having significant problems with anxiety or aggression.  Does not seem depressed, withdrawn, or apathetic.    No problems with sleep noted.     Review of Systems   Constitutional:  Negative for appetite change.   Eyes:  Negative for visual disturbance.          LP and MRI/MRV done July 2024 due to concern for papilledema at his last ophthalmology exam under anesthesia.   Respiratory:  Negative for choking and shortness of breath.    Cardiovascular:  Negative for chest pain.   Gastrointestinal:  Positive for constipation. Negative for nausea.          Bowel regimen: MiraLAX 1.5 capfuls BID; Motegrity 2mg daily; Linzess 145mcg daily; Bisacodyl suppository once daily; Bisacodyl 5mg daily; Docusate 100mg BID; MagOx 500mg  daily.  Omeprazole for EOE; next EGD Feb/Mar 2025.   Genitourinary:  Negative for enuresis, frequency and urgency.   Neurological:  Negative for dizziness, tremors, seizures (LTG 150mg twice daily) and syncope.   Psychiatric/Behavioral:  Negative for agitation, behavioral problems, self-injury and sleep disturbance.      MEDICATION HISTORY  Clomipramine  Depakote  Risperidone - gynecomastia and weight gain  Ziprasidone    CURRENT MEDICATIONS    Current Outpatient Medications:     adapalene (Differin) 0.1 % gel, APPLY A THIN FILM ON FACE EVERY DAY AT BEDTIME., Disp: 45 g, Rfl: 3    Banophen 25 mg tablet, Take 1 tablet (25 mg) by mouth as needed at bedtime for itching or allergies., Disp: 90 tablet, Rfl: 1    benzoyl peroxide (Benzac AC) 10 % external wash, Apply topically once daily., Disp: 187 g, Rfl: 11    bisacodyl (Dulcolax) 5 mg EC tablet, TAKE TWO TABLETS BY MOUTH EVERY DAY (DO NOT CRUSH, CHEW, OR SPLIT), Disp: 60 tablet, Rfl: 3    bisacodyl (Dulcolax) 5 mg EC tablet, Take 1 tablet (5 mg) by mouth once daily. Do not crush, chew, or split., Disp: 30 tablet, Rfl: 3    bisacodyl (Dulcolax, bisacodyl,) 10 mg suppository, Insert 1 suppository (10 mg) into the rectum once daily., Disp: 60 suppository, Rfl: 3    bismuth subsalicylate (Pepto Bismol) 262 mg/15 mL suspension, Take by mouth., Disp: , Rfl:     Blistex Medicated 0.6-0.5-1.1-0.5 % ointment ointment, , Disp: , Rfl:     Chest Congestion Relief DM  mg/5 mL syrup, , Disp: , Rfl:     cholecalciferol (Vitamin D3) 50 MCG (2000 UT) tablet, Take 1 tablet (2,000 Units) by mouth once daily., Disp: 90 tablet, Rfl: 2    ciclopirox (Penlac) 8 % solution, Apply topically once daily at bedtime. Apply to affected toenail(s) and adjacent skin once daily in combination with weekly nail trimming and periodic nail debridement. Remove with alcohol every 7 days; continue therapy until nail clearance., Disp: 6.6 mL, Rfl: 1    clindamycin (Cleocin T) 1 % lotion, APPLY  TOPICALLY TO FACE, CHEST, AND BACK ONCE DAILY AFTER WASHING., Disp: 60 mL, Rfl: 0    cloNIDine (Catapres) 0.1 mg tablet, TAKE 1/2 TABLET BY MOUTH THREE TIMES DAILY, Disp: 45 tablet, Rfl: 0    docusate sodium (Stool Softener) 100 mg capsule, Take 1 capsule (100 mg) by mouth 2 times a day., Disp: 180 capsule, Rfl: 0    fluoride, sodium, (Prevident 5000 Plus) 1.1 % dental cream, Apply topically., Disp: , Rfl:     fluoride, sodium, (PreviDent) 1.1 % gel, Apply 1 Application to teeth 2 times a day., Disp: 56 g, Rfl: 11    food supplemt, lactose-reduced (Boost Plus) 0.06 gram- 1.5 kcal/mL liquid, Take by mouth., Disp: , Rfl:     hydrocortisone 1 % cream, Apply topically twice a day., Disp: , Rfl:     hydrOXYzine HCL (Atarax) 10 mg tablet, TAKE ONE TABLET BY MOUTH TWICE DAILY (MORNING AND 4PM), Disp: 60 tablet, Rfl: 0    lamoTRIgine (LaMICtal) 150 mg tablet, Take 1 tablet (150 mg) by mouth 2 times a day., Disp: 60 tablet, Rfl: 5    linaCLOtide (Linzess) 145 mcg capsule, Take 1 capsule (145 mcg) by mouth once daily in the morning. Take before meals. Do not crush or chew., Disp: 90 capsule, Rfl: 3    liver oil-zinc oxide (Desitin) ointment, Apply topically if needed for irritation. Can be applied as needed for any anogenital irritation as barrier ointment to prevent skin breakdown., Disp: 56.7 g, Rfl: 3    magnesium hydroxide (Milk of Magnesia) 400 mg/5 mL suspension, Take by mouth., Disp: , Rfl:     magnesium oxide 500 mg magnesium tablet, TAKE ONE TABLET BY MOUTH EVERY DAY, Disp: 90 tablet, Rfl: 1    Medi-Pads 50 % pads, medicated pads, , Disp: , Rfl:     melatonin 3 mg tablet, Take 1 tablet (3 mg) by mouth once daily at bedtime., Disp: 90 tablet, Rfl: 3    menthol 5.8 mg lozenge, Take 1 Units by mouth every 4 hours if needed (cough)., Disp: 150 lozenge, Rfl: 3    mirtazapine (Remeron) 45 mg tablet, TAKE ONE TABLET BY MOUTH AT BEDTIME, Disp: 30 tablet, Rfl: 0    naltrexone (Depade) 50 mg tablet, Take 0.5 tablets (25 mg) by  mouth every morning and 0.25 tablets (12.5 mg) by mouth at bedtime., Disp: 68 tablet, Rfl: 5    nutritional supplement-caloric (Benecalorie) 7.5 kcal/mL liquid, Take 1 Container by mouth 2 times a day., Disp: 2640 mL, Rfl: 11    omeprazole (PriLOSEC) 40 mg DR capsule, Take 1 capsule (40 mg) by mouth once daily in the morning. Take before meals. Do not crush or chew., Disp: 180 capsule, Rfl: 1    polyethylene glycol (ClearLax) 17 gram/dose powder, Mix of powder and drink. mix 2 capfuls (34g) WITH EIGHT ounces of water OR gatorade AND take BY MOUTH TWICE DAILY (while awake), Disp: 510 g, Rfl: 11    prucalopride (Motegrity) 2 mg tablet, Take 1 tablet (2 mg) by mouth once daily., Disp: 269 tablet, Rfl: 0    Ready-To-Use Enema, min oil, enema, , Disp: , Rfl:     sodium phosphates 19-7 gram/197 mL enema, Insert into the rectum., Disp: , Rfl:     triamcinolone (Kenalog) 0.025 % cream, APPLY TOPICALLY TO THE AFFECTED AREA BY ADHESIVE (LEFT ELBOW) TWICE A DAY UNTIL RESOLVED FOR ADHESIVE INDUCED DERMATITIS., Disp: 15 g, Rfl: 0    Triple Antibiotic ointment, APPLY TOPICALLY TO CUTS/SCRAPES/SKIN ABRASIONS EVERY 12 HOURS AS NEEDED TO PREVENT SKIN INFECTIONS, Disp: , Rfl:     white petrolatum (Aquaphor Healing) 41 % ointment ointment, apply to lips every 4 hours while awake and PRN, Disp: , Rfl:     witch hazeL 20 % pads, medicated, USE AS DIRECTED ON PACKAGE, Disp: , Rfl:     witch hazel-glycerin-aloe vera 50 % pads, medicated, Apply topically., Disp: , Rfl:     SOCIAL HISTORY  Living situation Waiver home with 2 male house-mates   Provider agency [facility] (as of March 2024)   Work or day program [employer] program 5 days/week   School [employer] - graduated 2022   Guardianship Mother (Ivonne)   SSA ?   Bx Specialist ?   Nicotine None   Alcohol None   Other drugs None     OBJECTIVE    Lab Results   Component Value Date    HGB 14.7 09/13/2024     09/13/2024    NEUTROABS 2.18 06/02/2022    GLUCOSE 77  "10/13/2022     10/13/2022    K 4.2 10/13/2022    CO2 26 10/13/2022    CALCIUM 9.3 10/13/2022    CREATININE 1.11 10/13/2022    AST 16 06/02/2022    ALT 11 06/02/2022    TSH 1.51 06/02/2022    FREET4 1.13 06/02/2022    CHOL 173 06/02/2022    LDLF 103 06/02/2022    TRIG 71 06/02/2022    VITD25 48 06/02/2022     Therapeutic Drug Monitoring  05/02/2022: Lamotrigine level = 10.4 [2.5-15.0] (300mg/day)  10/14/2020: Lamotrigine level = 7.1 [2.5-15.0] (300mg/day)    Electrocardiograms  None in chart    MENTAL STATUS EXAM  General/Appearance: Appropriate dress/hygiene/grooming.  Attitude/Behavior: Average eye contact. Calm/pleasant. Superficially cooperative.  Speech/Communication: Scripting.  Motor: No abnormal or involuntary movements observed.  Gait: Not assessed at this visit.  Mood: Calm/pleasant.  Affect: Congruent. Seems happy.  Thought processes: Perseverative.  Thought content:  Repeatedly asks if dad is coming, or if there will be \"school\" tomorrow.  Perception: Does not appear to be experiencing or responding to hallucinations.  Sensorium/Cognition: Oriented to self and surroundings. Distracted. Minimal interest in participating. Listens quietly while others are talking.  Memory: Not directly assessed at this time.  Insight: Minimal.  Judgment: Behaviorally under control.    ASSESSMENT  John has been doing well from a psychiatric/behavioral standpoint.  No indication to make additional treatment changes at this time.    PROBLEM LIST  ASD  O/C behaviors  Anxiety  Epilepsy    PLAN  -- Continue naltrexone 25mg QAM and 12.5mg at bedtime for OCD  -- Continue hydroxyzine 10mg BID (AM & 16:00) for anxiety  -- Continue mirtazapine 45mg at bedtime for anxiety and OCD  -- Continue clonidine 0.05mg TID for anxiety  -- Follow up 12 months or sooner if needed    Amrit Nguyen MD    Prep time on date of the patient encounter: 5 minutes   Time spent directly with patient/family/caregiver: 35 minutes   Additional " time spent on patient care activities: 0 minutes   Documentation time: 5 minutes   Other time spent: 0 minutes   Total time on date of patient encounter: 45 minutes

## 2024-12-04 NOTE — TELEPHONE ENCOUNTER
Needs refill Boost Plus to [facility] and please call mom to discuss time frame for next endoscopy. Available after 1 PM.

## 2024-12-05 ENCOUNTER — APPOINTMENT (OUTPATIENT)
Dept: BEHAVIORAL HEALTH | Facility: CLINIC | Age: 24
End: 2024-12-05
Payer: MEDICAID

## 2024-12-05 DIAGNOSIS — R46.81 OBSESSIVE-COMPULSIVE BEHAVIOR: ICD-10-CM

## 2024-12-05 DIAGNOSIS — G40.909 NONINTRACTABLE EPILEPSY WITHOUT STATUS EPILEPTICUS, UNSPECIFIED EPILEPSY TYPE (MULTI): ICD-10-CM

## 2024-12-05 DIAGNOSIS — F84.0 AUTISM SPECTRUM DISORDER (HHS-HCC): ICD-10-CM

## 2024-12-05 DIAGNOSIS — G47.9 SLEEP DISTURBANCES: ICD-10-CM

## 2024-12-05 DIAGNOSIS — F41.8 OTHER SPECIFIED ANXIETY DISORDERS: Primary | ICD-10-CM

## 2024-12-05 PROCEDURE — 99214 OFFICE O/P EST MOD 30 MIN: CPT | Performed by: PSYCHIATRY & NEUROLOGY

## 2024-12-05 RX ORDER — MIRTAZAPINE 45 MG/1
45 TABLET, FILM COATED ORAL NIGHTLY
Qty: 30 TABLET | Refills: 11 | Status: SHIPPED | OUTPATIENT
Start: 2024-12-05

## 2024-12-05 RX ORDER — NALTREXONE HYDROCHLORIDE 50 MG/1
TABLET, FILM COATED ORAL
Qty: 23 TABLET | Refills: 11 | Status: SHIPPED | OUTPATIENT
Start: 2024-12-05

## 2024-12-05 RX ORDER — CLONIDINE HYDROCHLORIDE 0.1 MG/1
0.05 TABLET ORAL 3 TIMES DAILY
Qty: 45 TABLET | Refills: 11 | Status: SHIPPED | OUTPATIENT
Start: 2024-12-05

## 2024-12-05 RX ORDER — HYDROXYZINE HYDROCHLORIDE 10 MG/1
TABLET, FILM COATED ORAL
Qty: 60 TABLET | Refills: 11 | Status: SHIPPED | OUTPATIENT
Start: 2024-12-05

## 2024-12-06 PROBLEM — R05.9 COUGH: Status: RESOLVED | Noted: 2023-08-01 | Resolved: 2024-12-06

## 2024-12-06 PROBLEM — R62.7 FAILURE TO THRIVE IN ADULT: Status: RESOLVED | Noted: 2023-08-01 | Resolved: 2024-12-06

## 2024-12-06 ASSESSMENT — ENCOUNTER SYMPTOMS
AGITATION: 0
NAUSEA: 0
SEIZURES: 0
TREMORS: 0
SHORTNESS OF BREATH: 0
CONSTIPATION: 1
FREQUENCY: 0
CHOKING: 0
DIZZINESS: 0
APPETITE CHANGE: 0

## 2024-12-11 DIAGNOSIS — E55.9 VITAMIN D DEFICIENCY: ICD-10-CM

## 2024-12-11 RX ORDER — OMEGA-3S/DHA/EPA/FISH OIL/D3 300MG-1000
2000 CAPSULE ORAL DAILY
Qty: 90 TABLET | Refills: 2 | Status: SHIPPED | OUTPATIENT
Start: 2024-12-11

## 2024-12-14 DIAGNOSIS — K59.09 CONSTIPATION, CHRONIC: ICD-10-CM

## 2024-12-16 RX ORDER — POLYETHYLENE GLYCOL 3350 17 G/17G
POWDER, FOR SOLUTION ORAL
Qty: 1530 G | Refills: 11 | Status: SHIPPED | OUTPATIENT
Start: 2024-12-16

## 2024-12-26 DIAGNOSIS — K59.00 CONSTIPATION, UNSPECIFIED CONSTIPATION TYPE: ICD-10-CM

## 2024-12-26 RX ORDER — DOCUSATE SODIUM 100 MG/1
100 CAPSULE, LIQUID FILLED ORAL 2 TIMES DAILY
Qty: 180 CAPSULE | Refills: 1 | Status: SHIPPED | OUTPATIENT
Start: 2024-12-26

## 2024-12-26 RX ORDER — DOCUSATE SODIUM 100 MG/1
100 CAPSULE, LIQUID FILLED ORAL 2 TIMES DAILY
Qty: 180 CAPSULE | Refills: 0 | OUTPATIENT
Start: 2024-12-26

## 2025-01-17 ENCOUNTER — APPOINTMENT (OUTPATIENT)
Dept: PRIMARY CARE | Facility: CLINIC | Age: 25
End: 2025-01-17
Payer: MEDICAID

## 2025-01-17 VITALS
HEART RATE: 91 BPM | DIASTOLIC BLOOD PRESSURE: 84 MMHG | WEIGHT: 120.2 LBS | OXYGEN SATURATION: 98 % | BODY MASS INDEX: 18.82 KG/M2 | SYSTOLIC BLOOD PRESSURE: 120 MMHG

## 2025-01-17 DIAGNOSIS — Z13.220 LIPID SCREENING: ICD-10-CM

## 2025-01-17 DIAGNOSIS — G40.909 NONINTRACTABLE EPILEPSY WITHOUT STATUS EPILEPTICUS, UNSPECIFIED EPILEPSY TYPE (MULTI): ICD-10-CM

## 2025-01-17 DIAGNOSIS — L70.8 OTHER ACNE: ICD-10-CM

## 2025-01-17 DIAGNOSIS — E55.9 MILD VITAMIN D DEFICIENCY: ICD-10-CM

## 2025-01-17 DIAGNOSIS — G47.00 INSOMNIA, UNSPECIFIED TYPE: ICD-10-CM

## 2025-01-17 DIAGNOSIS — R71.8 MICROCYTOSIS: ICD-10-CM

## 2025-01-17 DIAGNOSIS — Z00.00 HEALTH MAINTENANCE EXAMINATION: Primary | ICD-10-CM

## 2025-01-17 PROCEDURE — 99395 PREV VISIT EST AGE 18-39: CPT | Performed by: STUDENT IN AN ORGANIZED HEALTH CARE EDUCATION/TRAINING PROGRAM

## 2025-01-17 PROCEDURE — 1036F TOBACCO NON-USER: CPT | Performed by: STUDENT IN AN ORGANIZED HEALTH CARE EDUCATION/TRAINING PROGRAM

## 2025-01-17 RX ORDER — BENZOYL PEROXIDE 100 MG/ML
LIQUID TOPICAL
Qty: 187 G | Refills: 11 | Status: SHIPPED | OUTPATIENT
Start: 2025-01-17

## 2025-01-17 RX ORDER — CLINDAMYCIN PHOSPHATE 10 UG/ML
LOTION TOPICAL
Qty: 60 ML | Refills: 3 | Status: SHIPPED | OUTPATIENT
Start: 2025-01-17

## 2025-01-17 ASSESSMENT — PAIN SCALES - GENERAL: PAINLEVEL_OUTOF10: 0-NO PAIN

## 2025-01-17 ASSESSMENT — PATIENT HEALTH QUESTIONNAIRE - PHQ9
1. LITTLE INTEREST OR PLEASURE IN DOING THINGS: NOT AT ALL
SUM OF ALL RESPONSES TO PHQ9 QUESTIONS 1 AND 2: 0
2. FEELING DOWN, DEPRESSED OR HOPELESS: NOT AT ALL

## 2025-01-17 NOTE — PROGRESS NOTES
John Williamson is a 24 y.o. male seen in Clinic at /[facility] by Dr. Momo Dc on 01/17/25 for routine care, as well as for management of the following chronic medical conditions: Epilepsy, chronic constipation/GI motility issues, ASD, OCD. Patient is accompanied to this visit by his father. He presents today for CPE.     UPDATES:   - asked facility to send over medication list for my review; provided with fax number  - papilledema on funduscopic examination for which MRI/MRV and LP pursued with reassuring results   - No changes to AED regimen per neurology   [  ] due for lamictal level; ordered with labs today   [  ] due for surveillance labs, ordered today   [  ] due for endoscopy and GI follow up; has been PPI compliant; orders and follow up visit in place  [  ] has not been using Benzoyl Peroxide or Clinda gel for skin/chest/back acne; refills today   [  ] offered Flu and HPV vaccines; deferred; Tdap due 2026; COVID booster through pharmacy advised     CHRONIC MEDICAL CONDITIONS:   #Constipation, EOE, Malnutrition   - Follows with Peds GI at [place]   - PPI for EOE (BID per last note); endoscopy due and ordered   - Miralax, Motegrity, Milk of Mag, Linzess, daily suppository   - Nutritional supplements (Mariah Farms 4x/day); close weight monitoring and f/u--weight trend overall reassuring     #Epilepsy  - Follows with Peds Neuro  - Lamictal 150mg BID   - No recent seizures, well controlled   - Annual visits and levels  [  ] lamictal level with labs     #Behavioral Issues, ASD, OCD  - Adult Psych, Dr. Nguyen   - Clonidine 0.05mg TID (anxiety), Naltrexone 25mg QAM and 12.5mg at bedtime (OCD), Hydroxyzine 10mg BID (AM and 4PM, anxiety), Mirtazapine 45mg at bedtime (anxiety, OCD)  [  ] annual visits/follow up    Past Medical History: as above (ASD diagnosed around 2.5 years of age)  Birth History: uncomplicated pregnancy and birth history per father     Past Medical History:   Diagnosis Date    Contusion  of scalp, subsequent encounter 06/15/2017    Contusion of scalp, subsequent encounter    Developmental disorder of scholastic skills, unspecified     Learning disability    Local infection of the skin and subcutaneous tissue, unspecified 02/22/2014    Finger infection    Personal history of diseases of the blood and blood-forming organs and certain disorders involving the immune mechanism 11/18/2020    History of anemia    Personal history of other (healed) physical injury and trauma 06/15/2017    History of strain    Personal history of other diseases of the nervous system and sense organs 03/23/2014    History of acute otitis media    Personal history of other mental and behavioral disorders     History of mental disorder    Personal history of other specified conditions 06/20/2020    History of fever    Unspecified convulsions (Multi) 12/23/2014    Seizures    Unspecified injury of left ankle, initial encounter 05/24/2018    Left ankle injury     Subspecialty Medical Care: GI and Neuro (peds) but will continue to see him; adult psychiatry     Past Surgical History: upper GI, no sedation issues   History reviewed. No pertinent surgical history.    Medications: Prevident, Acne   Current Outpatient Medications:     adapalene (Differin) 0.1 % gel, APPLY A THIN FILM ON FACE EVERY DAY AT BEDTIME., Disp: 45 g, Rfl: 3    bisacodyl (Dulcolax) 5 mg EC tablet, TAKE TWO TABLETS BY MOUTH EVERY DAY (DO NOT CRUSH, CHEW, OR SPLIT), Disp: 60 tablet, Rfl: 3    bisacodyl (Dulcolax) 5 mg EC tablet, Take 1 tablet (5 mg) by mouth once daily. Do not crush, chew, or split., Disp: 30 tablet, Rfl: 3    bisacodyl (Dulcolax, bisacodyl,) 10 mg suppository, Insert 1 suppository (10 mg) into the rectum once daily., Disp: 60 suppository, Rfl: 3    bismuth subsalicylate (Pepto Bismol) 262 mg/15 mL suspension, Take by mouth., Disp: , Rfl:     Blistex Medicated 0.6-0.5-1.1-0.5 % ointment ointment, , Disp: , Rfl:     ciclopirox (Penlac) 8 %  solution, Apply topically once daily at bedtime. Apply to affected toenail(s) and adjacent skin once daily in combination with weekly nail trimming and periodic nail debridement. Remove with alcohol every 7 days; continue therapy until nail clearance., Disp: 6.6 mL, Rfl: 1    cloNIDine (Catapres) 0.1 mg tablet, Take 0.5 tablets (0.05 mg) by mouth 3 times a day., Disp: 45 tablet, Rfl: 11    docusate sodium (Stool Softener) 100 mg capsule, Take 1 capsule (100 mg) by mouth 2 times a day., Disp: 180 capsule, Rfl: 1    fluoride, sodium, (PreviDent) 1.1 % gel, Apply 1 Application to teeth 2 times a day., Disp: 56 g, Rfl: 11    food supplemt, lactose-reduced (Boost Plus) 0.06 gram- 1.5 kcal/mL liquid, Take by mouth., Disp: , Rfl:     hydrocortisone 1 % cream, Apply topically twice a day., Disp: , Rfl:     lamoTRIgine (LaMICtal) 150 mg tablet, Take 1 tablet (150 mg) by mouth 2 times a day., Disp: 60 tablet, Rfl: 5    linaCLOtide (Linzess) 145 mcg capsule, Take 1 capsule (145 mcg) by mouth once daily in the morning. Take before meals. Do not crush or chew., Disp: 90 capsule, Rfl: 3    magnesium hydroxide (Milk of Magnesia) 400 mg/5 mL suspension, Take by mouth., Disp: , Rfl:     naltrexone (Depade) 50 mg tablet, Take 0.5 tablets (25 mg) by mouth every morning and 0.25 tablets (12.5 mg) by mouth at bedtime., Disp: 23 tablet, Rfl: 11    nutritional supplement-caloric (Benecalorie) 7.5 kcal/mL liquid, Take 1 Container by mouth 2 times a day., Disp: 2640 mL, Rfl: 11    polyethylene glycol (ClearLax) 17 gram/dose powder, Mix of powder and drink. mix 1.5 capfuls (25.5g) WITH EIGHT ounces of water OR gatorade AND take BY MOUTH TWICE DAILY (while awake), Disp: 1530 g, Rfl: 11    Ready-To-Use Enema, min oil, enema, , Disp: , Rfl:     benzoyl peroxide (Benzac AC) 10 % external wash, Apply topically once daily., Disp: 187 g, Rfl: 11    clindamycin (Cleocin T) 1 % lotion, APPLY TOPICALLY TO FACE, CHEST, AND BACK ONCE TO TWICE DAILY  AFTER WASHING., Disp: 60 mL, Rfl: 3    hydrOXYzine HCL (Atarax) 10 mg tablet, Take 1 tablet (10 mg) by mouth twice daily - morning and 4pm., Disp: 60 tablet, Rfl: 11    liver oil-zinc oxide (Desitin) ointment, Apply topically if needed for irritation. Can be applied as needed for any anogenital irritation as barrier ointment to prevent skin breakdown. (Patient not taking: Reported on 1/17/2025), Disp: 56.7 g, Rfl: 3    magnesium oxide 500 mg magnesium tablet, TAKE ONE TABLET BY MOUTH EVERY DAY, Disp: 90 tablet, Rfl: 1    melatonin 3 mg tablet, Take 1 tablet (3 mg) by mouth once daily at bedtime. (Patient not taking: Reported on 1/17/2025), Disp: 90 tablet, Rfl: 3    mirtazapine (Remeron) 45 mg tablet, Take 1 tablet (45 mg) by mouth once daily at bedtime. (Patient not taking: Reported on 1/17/2025), Disp: 30 tablet, Rfl: 11    omeprazole (PriLOSEC) 40 mg DR capsule, Take 1 capsule (40 mg) by mouth once daily in the morning. Take before meals. Do not crush or chew. (Patient not taking: Reported on 1/17/2025), Disp: 180 capsule, Rfl: 1    prucalopride (Motegrity) 2 mg tablet, Take 1 tablet (2 mg) by mouth once daily., Disp: 269 tablet, Rfl: 0    Vitamin D3 50 mcg (2,000 unit) tablet, TAKE ONE TABLET BY MOUTH EVERY DAY, Disp: 90 tablet, Rfl: 2  Pharmacy: [facility] ([place], [place])    Allergies: NKDA  No Known Allergies    Immunizations:  - Defers Flu  - Updated COVID advised  - Discussed HPV; considering  - Tdap due 2026    Family History: Dad with HTN, Mom with Depression, Paternal Grandfather with dementia (late onset, likely Alzheimer's)  No family history on file.    Social History:   Home/Living Situation: [facility], feels safe; visits every Sunday; mom and dad, older sister  Education: Graduated from [employer]; day program in [place], enjoys himself   Activities: enjoys sports, cars  Drug Use: never use  Diet: trouble with weight gain; takes everything by mouth; very long to eat meals; never  "nutrition through feeding tube; previously on Boost, Mar[employer] Farms, now on Benecalorie   Sexuality: never sexually active, has deferred HPV immunization and STI screening  Suicide/Depression/Anxiety: describes mood as \"happy\"  Sleep: no concerns    Transition Processes:   Financial/Health Insurance: Medicaid   Transportation: mother and father   Legal/Guardian: mother with guardianship   Contact Info: [phone] (dad); [phone] (mom)     Visit Vitals  /85 (BP Location: Right arm, Patient Position: Sitting, BP Cuff Size: Adult)   Pulse 91   Wt 54.5 kg (120 lb 3.2 oz)   SpO2 98%   BMI 18.82 kg/m²   Smoking Status Never   BSA 1.61 m²      PHYSICAL EXAM:   General: well appearing AA male, largely non-verbal with one words responses, limited eye contact during visit, unable to really meaningfully engage in visit   HEENT: MMM, NCAT  CV: RRR  PULM: CTAB  ABD: thin, soft, NT, ND, + bowel sounds  : no suprapubic or CVA tenderness  EXT: WWP, thin, no edema  SKIN: no rashes  NEURO: poor eye contact and difficult to engage in exam, normal gait, preserved strength, symmetric facies   PSYCH: largely non-verbal but not aggressive and overall pleasant during encounter     Assessment/Plan    John Williamson is a 24 y.o. male seen in Clinic at /[facility] by Dr. Momo Dc on 01/17/25 for routine care, as well as for management of the following chronic medical conditions: Epilepsy, chronic constipation/GI motility issues, ASD, OCD. Patient is accompanied to this visit by his father. He presents today for CPE.     UPDATES:   - asked facility to send over medication list for my review; provided with fax number  - papilledema on funduscopic examination for which MRI/MRV and LP pursued with reassuring results   - No changes to AED regimen per neurology   [  ] due for lamictal level; ordered with labs today   [  ] due for surveillance labs, ordered today   [  ] due for endoscopy and GI follow up; has been PPI compliant; " orders and follow up visit in place  [  ] has not been using Benzoyl Peroxide or Clinda gel for skin/chest/back acne; refills today   [  ] offered Flu and HPV vaccines; deferred; Tdap due 2026; COVID booster through pharmacy advised     CHRONIC MEDICAL CONDITIONS:   #Constipation, EOE, Malnutrition   - Follows with Peds GI at [place]   - PPI for EOE (BID per last note); endoscopy due and ordered   - Miralax, Motegrity, Milk of Mag, Linzess, daily suppository   - Nutritional supplements (Mariah Farms 4x/day); close weight monitoring and f/u--weight trend overall reassuring     #Epilepsy  - Follows with Peds Neuro  - Lamictal 150mg BID   - No recent seizures, well controlled   - Annual visits and levels  [  ] lamictal level with labs     #Behavioral Issues, ASD, OCD  - Adult Psych, Dr. Nguyen   - Clonidine 0.05mg TID (anxiety), Naltrexone 25mg QAM and 12.5mg at bedtime (OCD), Hydroxyzine 10mg BID (AM and 4PM, anxiety), Mirtazapine 45mg at bedtime (anxiety, OCD)  [  ] annual visits/follow up    #Health Maintenance  - Vision, Hearing Screens: prior concern for papilledema in 2024 with reassuring MRI/MRV and LP   - Counseling regarding alcohol/tobacco/drug use: NON-USER  - Depression Screen: stable, known diagnoses as above; follows with psych  - Lipid/BMI/A1C/Glucose Screening: labs today; stable weight   - Blood Pressure: reassuring in office today   - Safe Sexual Practices, STI, HIV screening: never sexually active; HPV vaccine offered; defers screening     #Transition of Care/Young Adult Care  - Health Literacy Assessment: working to engage patient in visit; accompanied by father; mother has guardianship of patient   - Medications: Active medications reviewed and updated  - Allergies: NONE  - Immunizations: considering HPV, Tdap due 2026; COVID and Flu shots recommended as per CDC guidelines   - Identified Adult or Subspecialty Providers: Dr. Momo Dc, Dr. Amrit Nguyen; follows with Peds GI and  Neuro teams who per father are happy to continue seeing him   - Resources Provided: discussed inpatient Med-Peds consult service should he require this during future admissions     Return to clinic annually for CPE, sooner if any issues arise.    Momo Dc MD  Internal Medicine-Pediatrics   [facility] [address], [address]  P: [phone], F: [phone]

## 2025-01-17 NOTE — PATIENT INSTRUCTIONS
Thank you for coming in today!    If you would like Flu or COVID vaccines, can pursue through pharmacy  HPV vaccine is also an option if you would like to pursue; I have this in my office if ever interested. It is a three shot series.   Tetanus shot due in 2026     Labs have been ordered  Fasting if possible, though not absolutely necessary if exceedingly cumbersome  Lamictal level ordered as per Dr. Salazar's request; will communicate with him those results    Daily Benzoyl Peroxide was to help with back and chest skin lesions   Can apply topical Clindamycin gel to area as well after washing  These scripts have been sent to pharmacy     Endoscopy and follow up with Christie Cool as planned     Have facility fax over a copy of medications for my review   Fax number [phone]    Reach out with any questions and happy new year!    Dr. GLOVER

## 2025-01-24 ENCOUNTER — TELEPHONE (OUTPATIENT)
Dept: OTHER | Age: 25
End: 2025-01-24
Payer: MEDICAID

## 2025-01-24 NOTE — TELEPHONE ENCOUNTER
Aliza called about a call she received from Namita martin and Dr. Dc in regard to medictions that he has been taken off of. Aliza would like a call back from provider or Vaishali-Nurse at [phone]

## 2025-01-27 ENCOUNTER — TELEPHONE (OUTPATIENT)
Dept: BEHAVIORAL HEALTH | Facility: CLINIC | Age: 25
End: 2025-01-27
Payer: MEDICAID

## 2025-01-27 DIAGNOSIS — G47.9 SLEEP DISTURBANCES: ICD-10-CM

## 2025-01-27 DIAGNOSIS — R46.81 OBSESSIVE-COMPULSIVE BEHAVIOR: ICD-10-CM

## 2025-01-27 DIAGNOSIS — F41.8 OTHER SPECIFIED ANXIETY DISORDERS: ICD-10-CM

## 2025-01-27 RX ORDER — HYDROXYZINE HYDROCHLORIDE 10 MG/1
TABLET, FILM COATED ORAL
Qty: 60 TABLET | Refills: 11 | Status: SHIPPED | OUTPATIENT
Start: 2025-01-27

## 2025-01-27 RX ORDER — NALTREXONE HYDROCHLORIDE 50 MG/1
TABLET, FILM COATED ORAL
Qty: 23 TABLET | Refills: 11 | Status: SHIPPED | OUTPATIENT
Start: 2025-01-27

## 2025-01-27 RX ORDER — MIRTAZAPINE 45 MG/1
45 TABLET, FILM COATED ORAL NIGHTLY
Qty: 30 TABLET | Refills: 11 | Status: SHIPPED | OUTPATIENT
Start: 2025-01-27

## 2025-01-27 RX ORDER — CLONIDINE HYDROCHLORIDE 0.1 MG/1
0.05 TABLET ORAL 3 TIMES DAILY
Qty: 45 TABLET | Refills: 11 | Status: SHIPPED | OUTPATIENT
Start: 2025-01-27

## 2025-01-29 DIAGNOSIS — L70.8 OTHER ACNE: ICD-10-CM

## 2025-01-29 DIAGNOSIS — K59.00 CONSTIPATION, UNSPECIFIED CONSTIPATION TYPE: ICD-10-CM

## 2025-01-30 RX ORDER — DOCUSATE SODIUM 100 MG/1
100 CAPSULE, LIQUID FILLED ORAL 2 TIMES DAILY
Qty: 180 CAPSULE | Refills: 1 | Status: SHIPPED | OUTPATIENT
Start: 2025-01-30

## 2025-01-30 RX ORDER — ADAPALENE 1 MG/G
GEL TOPICAL NIGHTLY
Qty: 45 G | Refills: 3 | Status: SHIPPED | OUTPATIENT
Start: 2025-01-30

## 2025-02-04 ENCOUNTER — TELEPHONE (OUTPATIENT)
Dept: PEDIATRIC GASTROENTEROLOGY | Facility: HOSPITAL | Age: 25
End: 2025-02-04
Payer: MEDICAID

## 2025-02-05 ENCOUNTER — TELEPHONE (OUTPATIENT)
Dept: PRIMARY CARE | Facility: CLINIC | Age: 25
End: 2025-02-05

## 2025-02-05 NOTE — TELEPHONE ENCOUNTER
Pt's nurse needs to know how many times a will the pt be using clindamycin, she states that it she can't put one or twice on the pt's MAR, it has to be one or the other

## 2025-02-12 ENCOUNTER — APPOINTMENT (OUTPATIENT)
Dept: PEDIATRIC GASTROENTEROLOGY | Facility: CLINIC | Age: 25
End: 2025-02-12
Payer: MEDICAID

## 2025-02-12 VITALS
BODY MASS INDEX: 18.7 KG/M2 | SYSTOLIC BLOOD PRESSURE: 142 MMHG | RESPIRATION RATE: 18 BRPM | HEART RATE: 104 BPM | WEIGHT: 123.4 LBS | HEIGHT: 68 IN | DIASTOLIC BLOOD PRESSURE: 93 MMHG

## 2025-02-12 DIAGNOSIS — K20.0 EOSINOPHILIC ESOPHAGITIS: ICD-10-CM

## 2025-02-12 DIAGNOSIS — K59.09 CHRONIC CONSTIPATION: ICD-10-CM

## 2025-02-12 DIAGNOSIS — F84.0 AUTISTIC DISORDER (HHS-HCC): ICD-10-CM

## 2025-02-12 DIAGNOSIS — K59.00 CONSTIPATION, UNSPECIFIED CONSTIPATION TYPE: ICD-10-CM

## 2025-02-12 DIAGNOSIS — K59.09 CONSTIPATION, CHRONIC: ICD-10-CM

## 2025-02-12 PROCEDURE — 3008F BODY MASS INDEX DOCD: CPT | Performed by: NURSE PRACTITIONER

## 2025-02-12 PROCEDURE — 99213 OFFICE O/P EST LOW 20 MIN: CPT | Performed by: NURSE PRACTITIONER

## 2025-02-12 PROCEDURE — 36415 COLL VENOUS BLD VENIPUNCTURE: CPT

## 2025-02-12 PROCEDURE — 83020 HEMOGLOBIN ELECTROPHORESIS: CPT

## 2025-02-12 PROCEDURE — 83021 HEMOGLOBIN CHROMOTOGRAPHY: CPT

## 2025-02-12 RX ORDER — PRUCALOPRIDE 2 MG/1
1 TABLET ORAL DAILY
Qty: 269 TABLET | Refills: 0 | Status: SHIPPED | OUTPATIENT
Start: 2025-02-12 | End: 2025-02-13 | Stop reason: SDUPTHER

## 2025-02-12 RX ORDER — OMEPRAZOLE 40 MG/1
40 CAPSULE, DELAYED RELEASE ORAL
Qty: 180 CAPSULE | Refills: 1 | Status: SHIPPED | OUTPATIENT
Start: 2025-02-12 | End: 2026-02-12

## 2025-02-12 RX ORDER — BISACODYL 10 MG/1
10 SUPPOSITORY RECTAL DAILY
Qty: 60 SUPPOSITORY | Refills: 3 | Status: SHIPPED | OUTPATIENT
Start: 2025-02-12 | End: 2026-02-12

## 2025-02-12 RX ORDER — BISACODYL 5 MG
5 TABLET, DELAYED RELEASE (ENTERIC COATED) ORAL DAILY
Qty: 30 TABLET | Refills: 3 | Status: SHIPPED | OUTPATIENT
Start: 2025-02-12

## 2025-02-12 RX ORDER — DOCUSATE SODIUM 100 MG/1
100 CAPSULE, LIQUID FILLED ORAL 2 TIMES DAILY
Qty: 180 CAPSULE | Refills: 1 | Status: SHIPPED | OUTPATIENT
Start: 2025-02-12

## 2025-02-12 ASSESSMENT — ENCOUNTER SYMPTOMS: CONSTIPATION: 1

## 2025-02-12 NOTE — PATIENT INSTRUCTIONS
Assessment/Plan        John Williamson is a 24 year old male with autism, EOE, history of poor weight gain and appetite.  He has had chronic constipation with a history of fecal soiling.     He is doing well.   Weight is stable.  Stooling program is stable.   EOE is controlled on omeprazole. Due for EGD    CONTINUE  MiraLAX 1.5 caps mixed in 8 oz two times a day in water or Gatorade while awake (will verify this is what he is getting)  Motegrity 2 mg tablet   Linzess 145 mg   Colace twice a day  Bisacodyl suppository once a day    Medication   - Linzess option of increasing the dose to 290 mcg (to see if he would go without the enema).     Dr. Dc for care (EGD, etc).      EOE  Omeprazole 40 mg twice a day.   Re Scope in Late 2024 or Early 2025 of sooner if needed.

## 2025-02-12 NOTE — LETTER
February 12, 2025     Momo Dc MD  [address]  [facility], [facility] [address]  [facility] [address]    Patient: John Williamson   YOB: 2000   Date of Visit: 2/12/2025       Dear Dr. Momo Dc MD:    Thank you for referring John Williamson to me for evaluation. Below are my notes for this consultation.  If you have questions, please do not hesitate to call me. I look forward to following your patient along with you.       Sincerely,     Christie Bella, APRN-CNP      CC: No Recipients  ______________________________________________________________________________________                          Pediatric Gastroenterology, Hepatology & Nutrition  Follow Up Visit        Follow Up Visit Patient ID: John Williamson is a 24 y.o. male who presents for history of constipation.  He lives in a group home but his parents visit him once a week (on Sunday Mornings). He has been doing really well on his maintenance regime for stooling.  Given his history of fecal impaction, he has been on a vigorous routine for stooling. He lives in a group home with two other gentleman.   He had someone that assists with giving him a daily suppository.     Regular Medications  MiraLAX 1.5 caps mixed in 8 oz two times a day in water or Gatorade while awake (will verify this is what he is getting)  Motegrity 2 mg tablet   Linzess 145 mg   Colace twice a day  Bisacodyl suppository once a day (staff or self administered)    He passes stool on his own but nobody records this. Nobody sees his stool.   He wears underwear.    Mom does his laundry at home so she can monitor soiling . Rare to have any soiling  When ever they see him, they feel his abdomen and it feels pretty good.  They feel it every Sunday.     Nutrition -   Not drinking much Boost Plus     EOE - takes Omeprazole. Last scope was Oct 2022 and was fine. We have discussed spacing out scopes unless symptomatic.   Continues to eat very  slowly.     Review of Systems   Eyes:  Eye redness: Right sclera only.   Gastrointestinal:  Positive for constipation.        Constipation under control   Neurological:         Autism  Limited discussion but answers questions   All other systems reviewed and are negative.       Objective     Physical Exam  Constitutional:       General: He is not in acute distress.     Appearance: Normal appearance. He is normal weight. He is not ill-appearing.   HENT:      Head: Normocephalic.      Right Ear: External ear normal.      Left Ear: External ear normal.      Nose: Nose normal.      Mouth/Throat:      Mouth: Mucous membranes are moist.   Eyes:      Extraocular Movements: Extraocular movements intact.      Comments: Mild sclera redness to right eye   Cardiovascular:      Rate and Rhythm: Normal rate and regular rhythm.   Pulmonary:      Effort: Pulmonary effort is normal.      Breath sounds: Normal breath sounds.   Abdominal:      General: Abdomen is flat. Bowel sounds are normal.      Palpations: Abdomen is soft.   Musculoskeletal:         General: Normal range of motion.      Cervical back: Normal range of motion.   Skin:     General: Skin is warm and dry.   Neurological:      Mental Status: He is alert. Mental status is at baseline.   Psychiatric:         Mood and Affect: Mood normal.       Assessment/Plan        John Williamson is a 24 year old male with autism, EOE, history of poor weight gain and appetite.  He has had chronic constipation with a history of fecal soiling.     He is doing well.   Weight is stable.  Stooling program is stable.   EOE is controlled on omeprazole. Due for EGD    CONTINUE  MiraLAX 1.5 caps mixed in 8 oz two times a day in water or Gatorade while awake (will verify this is what he is getting)  Motegrity 2 mg tablet   Linzess 145 mg   Colace twice a day  Bisacodyl suppository once a day    Medication   - Linzess option of increasing the dose to 290 mcg (to see if he would go without the  enema).     Dr. Dc for care (EGD, etc).      EOE  Omeprazole 40 mg twice a day.   Re Scope in Late 2024 or Early 2025 of sooner if needed.       Follow up  Only as needed  If GI needs to continue to prescribe medications, than we can see once a year

## 2025-02-12 NOTE — PROGRESS NOTES
Pediatric Gastroenterology, Hepatology & Nutrition  Follow Up Visit        Follow Up Visit Patient ID: John Williamson is a 24 y.o. male who presents for history of constipation.  He lives in a group home but his parents visit him once a week (on Sunday Mornings). He has been doing really well on his maintenance regime for stooling.  Given his history of fecal impaction, he has been on a vigorous routine for stooling. He lives in a group home with two other gentleman.   He had someone that assists with giving him a daily suppository.     Regular Medications  MiraLAX 1.5 caps mixed in 8 oz two times a day in water or Gatorade while awake (will verify this is what he is getting)  Motegrity 2 mg tablet   Linzess 145 mg   Colace twice a day  Bisacodyl suppository once a day (staff or self administered)    He passes stool on his own but nobody records this. Nobody sees his stool.   He wears underwear.    Mom does his laundry at home so she can monitor soiling . Rare to have any soiling  When ever they see him, they feel his abdomen and it feels pretty good.  They feel it every Sunday.     Nutrition -   Not drinking much Boost Plus     EOE - takes Omeprazole. Last scope was Oct 2022 and was fine. We have discussed spacing out scopes unless symptomatic.   Continues to eat very slowly.     Review of Systems   Eyes:  Eye redness: Right sclera only.   Gastrointestinal:  Positive for constipation.        Constipation under control   Neurological:         Autism  Limited discussion but answers questions   All other systems reviewed and are negative.       Objective     Physical Exam  Constitutional:       General: He is not in acute distress.     Appearance: Normal appearance. He is normal weight. He is not ill-appearing.   HENT:      Head: Normocephalic.      Right Ear: External ear normal.      Left Ear: External ear normal.      Nose: Nose normal.      Mouth/Throat:      Mouth: Mucous membranes are  moist.   Eyes:      Extraocular Movements: Extraocular movements intact.      Comments: Mild sclera redness to right eye   Cardiovascular:      Rate and Rhythm: Normal rate and regular rhythm.   Pulmonary:      Effort: Pulmonary effort is normal.      Breath sounds: Normal breath sounds.   Abdominal:      General: Abdomen is flat. Bowel sounds are normal.      Palpations: Abdomen is soft.   Musculoskeletal:         General: Normal range of motion.      Cervical back: Normal range of motion.   Skin:     General: Skin is warm and dry.   Neurological:      Mental Status: He is alert. Mental status is at baseline.   Psychiatric:         Mood and Affect: Mood normal.       Assessment/Plan        John Williamson is a 24 year old male with autism, EOE, history of poor weight gain and appetite.  He has had chronic constipation with a history of fecal soiling.     He is doing well.   Weight is stable.  Stooling program is stable.   EOE is controlled on omeprazole. Due for EGD    CONTINUE  MiraLAX 1.5 caps mixed in 8 oz two times a day in water or Gatorade while awake (will verify this is what he is getting)  Motegrity 2 mg tablet   Linzess 145 mg   Colace twice a day  Bisacodyl suppository once a day    Medication   - Linzess option of increasing the dose to 290 mcg (to see if he would go without the enema).     Dr. Dc for care (EGD, etc).      EOE  Omeprazole 40 mg twice a day.   Re Scope in Late 2024 or Early 2025 of sooner if needed.       Follow up  Only as needed  If GI needs to continue to prescribe medications, than we can see once a year

## 2025-02-13 ENCOUNTER — LAB (OUTPATIENT)
Dept: LAB | Facility: HOSPITAL | Age: 25
End: 2025-02-13
Payer: MEDICAID

## 2025-02-13 DIAGNOSIS — R71.8 MICROCYTOSIS: ICD-10-CM

## 2025-02-13 DIAGNOSIS — K59.09 CHRONIC CONSTIPATION: ICD-10-CM

## 2025-02-13 LAB
HEMOGLOBIN A2: 2.5 % (ref 2–3.5)
HEMOGLOBIN A: 97.3 % (ref 95.8–98)
HEMOGLOBIN F: 0.2 % (ref 0–2)
HEMOGLOBIN IDENTIFICATION INTERPRETATION: NORMAL
HOLD SPECIMEN: NORMAL
PATH REVIEW-HGB IDENTIFICATION: NORMAL

## 2025-02-13 RX ORDER — PRUCALOPRIDE 2 MG/1
1 TABLET ORAL DAILY
Qty: 30 TABLET | Refills: 3 | Status: SHIPPED | OUTPATIENT
Start: 2025-02-13

## 2025-02-14 LAB
25(OH)D3+25(OH)D2 SERPL-MCNC: 47 NG/ML (ref 30–100)
ALBUMIN SERPL-MCNC: 5 G/DL (ref 3.6–5.1)
ALP SERPL-CCNC: 37 U/L (ref 36–130)
ALT SERPL-CCNC: 15 U/L (ref 9–46)
ANION GAP SERPL CALCULATED.4IONS-SCNC: 11 MMOL/L (CALC) (ref 7–17)
AST SERPL-CCNC: 13 U/L (ref 10–40)
BASOPHILS # BLD AUTO: 89 CELLS/UL (ref 0–200)
BASOPHILS NFR BLD AUTO: 1.9 %
BILIRUB SERPL-MCNC: 0.3 MG/DL (ref 0.2–1.2)
BUN SERPL-MCNC: 17 MG/DL (ref 7–25)
CALCIUM SERPL-MCNC: 9.9 MG/DL (ref 8.6–10.3)
CHLORIDE SERPL-SCNC: 102 MMOL/L (ref 98–110)
CHOLEST SERPL-MCNC: 195 MG/DL
CHOLEST/HDLC SERPL: 3 (CALC)
CO2 SERPL-SCNC: 26 MMOL/L (ref 20–32)
CREAT SERPL-MCNC: 0.86 MG/DL (ref 0.6–1.24)
EGFRCR SERPLBLD CKD-EPI 2021: 124 ML/MIN/1.73M2
EOSINOPHIL # BLD AUTO: 221 CELLS/UL (ref 15–500)
EOSINOPHIL NFR BLD AUTO: 4.7 %
ERYTHROCYTE [DISTWIDTH] IN BLOOD BY AUTOMATED COUNT: 14.7 % (ref 11–15)
GLUCOSE SERPL-MCNC: 99 MG/DL (ref 65–139)
HCT VFR BLD AUTO: 50.6 % (ref 38.5–50)
HDLC SERPL-MCNC: 66 MG/DL
HGB BLD-MCNC: 15.3 G/DL (ref 13.2–17.1)
LAMOTRIGINE SERPL-MCNC: 8.6 MCG/ML (ref 2.5–15)
LDLC SERPL CALC-MCNC: 105 MG/DL (CALC)
LYMPHOCYTES # BLD AUTO: 1795 CELLS/UL (ref 850–3900)
LYMPHOCYTES NFR BLD AUTO: 38.2 %
MCH RBC QN AUTO: 22.8 PG (ref 27–33)
MCHC RBC AUTO-ENTMCNC: 30.2 G/DL (ref 32–36)
MCV RBC AUTO: 75.4 FL (ref 80–100)
MONOCYTES # BLD AUTO: 569 CELLS/UL (ref 200–950)
MONOCYTES NFR BLD AUTO: 12.1 %
NEUTROPHILS # BLD AUTO: 2026 CELLS/UL (ref 1500–7800)
NEUTROPHILS NFR BLD AUTO: 43.1 %
NONHDLC SERPL-MCNC: 129 MG/DL (CALC)
PLATELET # BLD AUTO: 234 THOUSAND/UL (ref 140–400)
PMV BLD REES-ECKER: 10.8 FL (ref 7.5–12.5)
POTASSIUM SERPL-SCNC: 4.2 MMOL/L (ref 3.5–5.3)
PROT SERPL-MCNC: 7.8 G/DL (ref 6.1–8.1)
RBC # BLD AUTO: 6.71 MILLION/UL (ref 4.2–5.8)
SODIUM SERPL-SCNC: 139 MMOL/L (ref 135–146)
TRIGL SERPL-MCNC: 139 MG/DL
TSH SERPL-ACNC: 0.79 MIU/L (ref 0.4–4.5)
WBC # BLD AUTO: 4.7 THOUSAND/UL (ref 3.8–10.8)

## 2025-02-25 ENCOUNTER — TELEPHONE (OUTPATIENT)
Dept: PEDIATRIC GASTROENTEROLOGY | Facility: HOSPITAL | Age: 25
End: 2025-02-25
Payer: MEDICAID

## 2025-02-25 ENCOUNTER — TELEPHONE (OUTPATIENT)
Dept: BEHAVIORAL HEALTH | Facility: CLINIC | Age: 25
End: 2025-02-25
Payer: MEDICAID

## 2025-02-25 DIAGNOSIS — B35.1 ONYCHOMYCOSIS: ICD-10-CM

## 2025-02-25 DIAGNOSIS — L70.8 OTHER ACNE: ICD-10-CM

## 2025-02-25 DIAGNOSIS — R46.81 OBSESSIVE-COMPULSIVE BEHAVIOR: Primary | ICD-10-CM

## 2025-02-25 RX ORDER — ADAPALENE 1 MG/G
GEL TOPICAL NIGHTLY
Qty: 45 G | Refills: 3 | Status: SHIPPED | OUTPATIENT
Start: 2025-02-25

## 2025-02-25 RX ORDER — NALTREXONE HYDROCHLORIDE 50 MG/1
TABLET, FILM COATED ORAL
Qty: 23 TABLET | Refills: 11 | Status: SHIPPED | OUTPATIENT
Start: 2025-02-25

## 2025-02-25 RX ORDER — CICLOPIROX 80 MG/ML
SOLUTION TOPICAL NIGHTLY
Qty: 6.6 ML | Refills: 1 | Status: SHIPPED | OUTPATIENT
Start: 2025-02-25

## 2025-02-25 NOTE — TELEPHONE ENCOUNTER
Ms. Patrice the caregiver for the patient tried to  medications from [facility] but they informed her they were already filled by [facility]. She does not know who [facility] is.  Scripts were supposed to go to [facility] but went to [facility]. Linzess, Motegrity and Bisacodyl.

## 2025-02-26 DIAGNOSIS — K20.0 EOSINOPHILIC ESOPHAGITIS: ICD-10-CM

## 2025-02-26 DIAGNOSIS — K59.09 CONSTIPATION, CHRONIC: ICD-10-CM

## 2025-02-26 DIAGNOSIS — K59.09 CHRONIC CONSTIPATION: ICD-10-CM

## 2025-02-26 DIAGNOSIS — E55.9 VITAMIN D DEFICIENCY: ICD-10-CM

## 2025-02-26 NOTE — TELEPHONE ENCOUNTER
MsAngel Patrice called stating that she does not know why [facility] got the prescriptions or who they are. Mom is requesting these 3 medications be sent to the [facility] because she has new insurance.

## 2025-02-27 RX ORDER — BISACODYL 5 MG
5 TABLET, DELAYED RELEASE (ENTERIC COATED) ORAL DAILY
Qty: 30 TABLET | Refills: 3 | Status: SHIPPED | OUTPATIENT
Start: 2025-02-27

## 2025-02-27 RX ORDER — PRUCALOPRIDE 2 MG/1
1 TABLET ORAL DAILY
Qty: 30 TABLET | Refills: 3 | Status: SHIPPED | OUTPATIENT
Start: 2025-02-27

## 2025-03-17 DIAGNOSIS — L70.8 OTHER ACNE: ICD-10-CM

## 2025-03-18 RX ORDER — BENZOYL PEROXIDE 100 MG/ML
LIQUID TOPICAL
Qty: 148 G | Refills: 10 | Status: SHIPPED | OUTPATIENT
Start: 2025-03-18

## 2025-03-26 ENCOUNTER — TELEPHONE (OUTPATIENT)
Dept: PEDIATRIC GASTROENTEROLOGY | Facility: HOSPITAL | Age: 25
End: 2025-03-26
Payer: MEDICAID

## 2025-04-10 DIAGNOSIS — L70.8 OTHER ACNE: ICD-10-CM

## 2025-04-12 DIAGNOSIS — B35.1 ONYCHOMYCOSIS: ICD-10-CM

## 2025-04-13 RX ORDER — CLINDAMYCIN PHOSPHATE 10 UG/ML
LOTION TOPICAL
Qty: 60 ML | Refills: 11 | Status: SHIPPED | OUTPATIENT
Start: 2025-04-13

## 2025-04-13 RX ORDER — CICLOPIROX 80 MG/ML
SOLUTION TOPICAL
Qty: 6.6 ML | Refills: 11 | Status: SHIPPED | OUTPATIENT
Start: 2025-04-13

## 2025-04-21 DIAGNOSIS — K59.09 CONSTIPATION, CHRONIC: ICD-10-CM

## 2025-04-21 DIAGNOSIS — F84.0 AUTISTIC DISORDER (HHS-HCC): ICD-10-CM

## 2025-04-23 ENCOUNTER — TELEPHONE (OUTPATIENT)
Dept: PEDIATRIC GASTROENTEROLOGY | Facility: CLINIC | Age: 25
End: 2025-04-23
Payer: MEDICAID

## 2025-04-23 RX ORDER — BISACODYL 10 MG/1
10 SUPPOSITORY RECTAL DAILY
Qty: 60 SUPPOSITORY | Refills: 3 | Status: SHIPPED | OUTPATIENT
Start: 2025-04-23 | End: 2026-04-23

## 2025-06-10 DIAGNOSIS — L70.8 OTHER ACNE: ICD-10-CM

## 2025-06-10 DIAGNOSIS — K56.41 FECAL IMPACTION OF COLON (MULTI): ICD-10-CM

## 2025-06-10 DIAGNOSIS — K58.1 IRRITABLE BOWEL SYNDROME WITH CONSTIPATION: ICD-10-CM

## 2025-06-10 DIAGNOSIS — K59.09 CHRONIC CONSTIPATION: ICD-10-CM

## 2025-06-10 DIAGNOSIS — R14.0 ABDOMINAL BLOATING: ICD-10-CM

## 2025-06-11 RX ORDER — PRUCALOPRIDE 2 MG/1
1 TABLET ORAL DAILY
Qty: 30 TABLET | Refills: 6 | Status: SHIPPED | OUTPATIENT
Start: 2025-06-11

## 2025-06-12 RX ORDER — ADAPALENE 0.1 %
GEL (GRAM) TOPICAL NIGHTLY
Qty: 45 G | Refills: 11 | Status: SHIPPED | OUTPATIENT
Start: 2025-06-12

## 2025-11-20 ENCOUNTER — APPOINTMENT (OUTPATIENT)
Dept: PEDIATRIC NEUROLOGY | Facility: CLINIC | Age: 25
End: 2025-11-20
Payer: MEDICAID

## 2025-12-05 ENCOUNTER — APPOINTMENT (OUTPATIENT)
Dept: BEHAVIORAL HEALTH | Facility: CLINIC | Age: 25
End: 2025-12-05
Payer: MEDICAID